# Patient Record
Sex: MALE | URBAN - METROPOLITAN AREA
[De-identification: names, ages, dates, MRNs, and addresses within clinical notes are randomized per-mention and may not be internally consistent; named-entity substitution may affect disease eponyms.]

---

## 2023-01-01 ENCOUNTER — LAB (OUTPATIENT)
Dept: LAB | Facility: CLINIC | Age: 78
End: 2023-01-01
Attending: INTERNAL MEDICINE
Payer: COMMERCIAL

## 2023-01-01 ENCOUNTER — HOSPITAL ENCOUNTER (OUTPATIENT)
Dept: NUCLEAR MEDICINE | Facility: CLINIC | Age: 78
Setting detail: NUCLEAR MEDICINE
Discharge: HOME | End: 2023-10-26
Attending: STUDENT IN AN ORGANIZED HEALTH CARE EDUCATION/TRAINING PROGRAM | Admitting: STUDENT IN AN ORGANIZED HEALTH CARE EDUCATION/TRAINING PROGRAM
Payer: COMMERCIAL

## 2023-01-01 ENCOUNTER — TELEPHONE (OUTPATIENT)
Dept: NUCLEAR MEDICINE | Facility: CLINIC | Age: 78
End: 2023-01-01
Payer: COMMERCIAL

## 2023-01-01 ENCOUNTER — HOSPITAL ENCOUNTER (OUTPATIENT)
Dept: NUCLEAR MEDICINE | Facility: CLINIC | Age: 78
Setting detail: NUCLEAR MEDICINE
Discharge: HOME | End: 2023-12-07
Attending: STUDENT IN AN ORGANIZED HEALTH CARE EDUCATION/TRAINING PROGRAM
Payer: COMMERCIAL

## 2023-01-01 ENCOUNTER — ONCOLOGY VISIT (OUTPATIENT)
Dept: ONCOLOGY | Facility: CLINIC | Age: 78
End: 2023-01-01
Attending: STUDENT IN AN ORGANIZED HEALTH CARE EDUCATION/TRAINING PROGRAM
Payer: COMMERCIAL

## 2023-01-01 ENCOUNTER — APPOINTMENT (OUTPATIENT)
Dept: LAB | Facility: CLINIC | Age: 78
End: 2023-01-01
Attending: STUDENT IN AN ORGANIZED HEALTH CARE EDUCATION/TRAINING PROGRAM
Payer: COMMERCIAL

## 2023-01-01 ENCOUNTER — TELEPHONE (OUTPATIENT)
Dept: NUCLEAR MEDICINE | Facility: CLINIC | Age: 78
End: 2023-01-01

## 2023-01-01 ENCOUNTER — PRE VISIT (OUTPATIENT)
Dept: ONCOLOGY | Facility: CLINIC | Age: 78
End: 2023-01-01
Payer: COMMERCIAL

## 2023-01-01 ENCOUNTER — ALLIED HEALTH/NURSE VISIT (OUTPATIENT)
Dept: ONCOLOGY | Facility: CLINIC | Age: 78
End: 2023-01-01

## 2023-01-01 ENCOUNTER — TRANSCRIBE ORDERS (OUTPATIENT)
Dept: OTHER | Age: 78
End: 2023-01-01

## 2023-01-01 ENCOUNTER — LAB REQUISITION (OUTPATIENT)
Dept: LAB | Facility: CLINIC | Age: 78
End: 2023-01-01
Payer: COMMERCIAL

## 2023-01-01 ENCOUNTER — PATIENT OUTREACH (OUTPATIENT)
Dept: ONCOLOGY | Facility: CLINIC | Age: 78
End: 2023-01-01
Payer: COMMERCIAL

## 2023-01-01 ENCOUNTER — ONCOLOGY VISIT (OUTPATIENT)
Dept: ONCOLOGY | Facility: CLINIC | Age: 78
End: 2023-01-01
Attending: INTERNAL MEDICINE
Payer: COMMERCIAL

## 2023-01-01 VITALS
OXYGEN SATURATION: 98 % | DIASTOLIC BLOOD PRESSURE: 67 MMHG | RESPIRATION RATE: 14 BRPM | SYSTOLIC BLOOD PRESSURE: 128 MMHG | TEMPERATURE: 98 F | HEART RATE: 75 BPM

## 2023-01-01 VITALS
OXYGEN SATURATION: 98 % | BODY MASS INDEX: 24.94 KG/M2 | RESPIRATION RATE: 12 BRPM | TEMPERATURE: 98.4 F | DIASTOLIC BLOOD PRESSURE: 77 MMHG | WEIGHT: 158.9 LBS | HEIGHT: 67 IN | SYSTOLIC BLOOD PRESSURE: 137 MMHG | HEART RATE: 79 BPM

## 2023-01-01 VITALS
TEMPERATURE: 97.8 F | WEIGHT: 153.6 LBS | HEART RATE: 80 BPM | DIASTOLIC BLOOD PRESSURE: 62 MMHG | OXYGEN SATURATION: 97 % | RESPIRATION RATE: 16 BRPM | BODY MASS INDEX: 23.77 KG/M2 | SYSTOLIC BLOOD PRESSURE: 123 MMHG

## 2023-01-01 DIAGNOSIS — D50.8 IRON DEFICIENCY ANEMIA SECONDARY TO INADEQUATE DIETARY IRON INTAKE: Primary | ICD-10-CM

## 2023-01-01 DIAGNOSIS — T45.1X5A ANTINEOPLASTIC CHEMOTHERAPY INDUCED ANEMIA: ICD-10-CM

## 2023-01-01 DIAGNOSIS — C61 HORMONE RESISTANT PROSTATE CANCER (H): Primary | ICD-10-CM

## 2023-01-01 DIAGNOSIS — D64.81 ANTINEOPLASTIC CHEMOTHERAPY INDUCED ANEMIA: ICD-10-CM

## 2023-01-01 DIAGNOSIS — C61 PROSTATE CANCER (H): Primary | ICD-10-CM

## 2023-01-01 DIAGNOSIS — Z19.2 HORMONE RESISTANT PROSTATE CANCER (H): Primary | ICD-10-CM

## 2023-01-01 DIAGNOSIS — C61 PROSTATE CANCER METASTATIC TO BONE (H): ICD-10-CM

## 2023-01-01 DIAGNOSIS — C61 HORMONE RESISTANT PROSTATE CANCER (H): ICD-10-CM

## 2023-01-01 DIAGNOSIS — Z19.2 HORMONE RESISTANT PROSTATE CANCER (H): ICD-10-CM

## 2023-01-01 DIAGNOSIS — C61 PROSTATE CANCER METASTATIC TO BONE (H): Primary | ICD-10-CM

## 2023-01-01 DIAGNOSIS — C79.51 PROSTATE CANCER METASTATIC TO BONE (H): Primary | ICD-10-CM

## 2023-01-01 DIAGNOSIS — C61 MALIGNANT NEOPLASM OF PROSTATE METASTATIC TO LYMPH NODES OF MULTIPLE SITES (H): ICD-10-CM

## 2023-01-01 DIAGNOSIS — C79.51 PROSTATE CANCER METASTATIC TO BONE (H): ICD-10-CM

## 2023-01-01 DIAGNOSIS — C77.8 MALIGNANT NEOPLASM OF PROSTATE METASTATIC TO LYMPH NODES OF MULTIPLE SITES (H): ICD-10-CM

## 2023-01-01 LAB
ABO/RH(D): NORMAL
ALBUMIN SERPL BCG-MCNC: 3.5 G/DL (ref 3.5–5.2)
ALBUMIN SERPL BCG-MCNC: 3.8 G/DL (ref 3.5–5.2)
ALP SERPL-CCNC: 148 U/L (ref 40–129)
ALP SERPL-CCNC: 224 U/L (ref 40–150)
ALT SERPL W P-5'-P-CCNC: 17 U/L (ref 0–70)
ALT SERPL W P-5'-P-CCNC: 22 U/L (ref 0–70)
ANION GAP SERPL CALCULATED.3IONS-SCNC: 11 MMOL/L (ref 7–15)
ANION GAP SERPL CALCULATED.3IONS-SCNC: 11 MMOL/L (ref 7–15)
ANTIBODY SCREEN: NEGATIVE
AST SERPL W P-5'-P-CCNC: 54 U/L (ref 0–45)
AST SERPL W P-5'-P-CCNC: 61 U/L (ref 0–45)
BASO+EOS+MONOS # BLD AUTO: ABNORMAL 10*3/UL
BASO+EOS+MONOS NFR BLD AUTO: ABNORMAL %
BASOPHILS # BLD AUTO: 0.1 10E3/UL (ref 0–0.2)
BASOPHILS # BLD AUTO: 0.1 10E3/UL (ref 0–0.2)
BASOPHILS NFR BLD AUTO: 1 %
BASOPHILS NFR BLD AUTO: 1 %
BILIRUB SERPL-MCNC: 0.2 MG/DL
BILIRUB SERPL-MCNC: 0.4 MG/DL
BUN SERPL-MCNC: 37.1 MG/DL (ref 8–23)
BUN SERPL-MCNC: 42.2 MG/DL (ref 8–23)
CALCIUM SERPL-MCNC: 9.3 MG/DL (ref 8.8–10.2)
CALCIUM SERPL-MCNC: 9.5 MG/DL (ref 8.8–10.2)
CHLORIDE SERPL-SCNC: 104 MMOL/L (ref 98–107)
CHLORIDE SERPL-SCNC: 105 MMOL/L (ref 98–107)
CREAT SERPL-MCNC: 1.21 MG/DL (ref 0.67–1.17)
CREAT SERPL-MCNC: 1.32 MG/DL (ref 0.67–1.17)
DEPRECATED HCO3 PLAS-SCNC: 24 MMOL/L (ref 22–29)
DEPRECATED HCO3 PLAS-SCNC: 25 MMOL/L (ref 22–29)
EGFRCR SERPLBLD CKD-EPI 2021: 56 ML/MIN/1.73M2
EGFRCR SERPLBLD CKD-EPI 2021: 62 ML/MIN/1.73M2
EOSINOPHIL # BLD AUTO: 0 10E3/UL (ref 0–0.7)
EOSINOPHIL # BLD AUTO: 0.5 10E3/UL (ref 0–0.7)
EOSINOPHIL NFR BLD AUTO: 0 %
EOSINOPHIL NFR BLD AUTO: 5 %
ERYTHROCYTE [DISTWIDTH] IN BLOOD BY AUTOMATED COUNT: 17.2 % (ref 10–15)
ERYTHROCYTE [DISTWIDTH] IN BLOOD BY AUTOMATED COUNT: 19.7 % (ref 10–15)
FERRITIN SERPL-MCNC: 472 NG/ML (ref 31–409)
GLUCOSE SERPL-MCNC: 101 MG/DL (ref 70–99)
GLUCOSE SERPL-MCNC: 102 MG/DL (ref 70–99)
HCT VFR BLD AUTO: 29.6 % (ref 40–53)
HCT VFR BLD AUTO: 30.4 % (ref 40–53)
HGB BLD-MCNC: 9.5 G/DL (ref 13.3–17.7)
HGB BLD-MCNC: 9.6 G/DL (ref 13.3–17.7)
IMM GRANULOCYTES # BLD: 0 10E3/UL
IMM GRANULOCYTES # BLD: 0.1 10E3/UL
IMM GRANULOCYTES NFR BLD: 0 %
IMM GRANULOCYTES NFR BLD: 1 %
IRON BINDING CAPACITY (ROCHE): 270 UG/DL (ref 240–430)
IRON SATN MFR SERPL: 14 % (ref 15–46)
IRON SERPL-MCNC: 39 UG/DL (ref 61–157)
LYMPHOCYTES # BLD AUTO: 0.6 10E3/UL (ref 0.8–5.3)
LYMPHOCYTES # BLD AUTO: 0.7 10E3/UL (ref 0.8–5.3)
LYMPHOCYTES NFR BLD AUTO: 5 %
LYMPHOCYTES NFR BLD AUTO: 8 %
MCH RBC QN AUTO: 29.5 PG (ref 26.5–33)
MCH RBC QN AUTO: 30.4 PG (ref 26.5–33)
MCHC RBC AUTO-ENTMCNC: 31.6 G/DL (ref 31.5–36.5)
MCHC RBC AUTO-ENTMCNC: 32.1 G/DL (ref 31.5–36.5)
MCV RBC AUTO: 94 FL (ref 78–100)
MCV RBC AUTO: 95 FL (ref 78–100)
MONOCYTES # BLD AUTO: 0.5 10E3/UL (ref 0–1.3)
MONOCYTES # BLD AUTO: 0.7 10E3/UL (ref 0–1.3)
MONOCYTES NFR BLD AUTO: 3 %
MONOCYTES NFR BLD AUTO: 8 %
NEUTROPHILS # BLD AUTO: 12.1 10E3/UL (ref 1.6–8.3)
NEUTROPHILS # BLD AUTO: 7 10E3/UL (ref 1.6–8.3)
NEUTROPHILS NFR BLD AUTO: 78 %
NEUTROPHILS NFR BLD AUTO: 90 %
NRBC # BLD AUTO: 0 10E3/UL
NRBC # BLD AUTO: 0 10E3/UL
NRBC BLD AUTO-RTO: 0 /100
NRBC BLD AUTO-RTO: 0 /100
PATH REPORT.COMMENTS IMP SPEC: ABNORMAL
PATH REPORT.COMMENTS IMP SPEC: ABNORMAL
PATH REPORT.COMMENTS IMP SPEC: YES
PATH REPORT.FINAL DX SPEC: ABNORMAL
PATH REPORT.MICROSCOPIC SPEC OTHER STN: ABNORMAL
PATH REPORT.RELEVANT HX SPEC: ABNORMAL
PATH REPORT.SITE OF ORIGIN SPEC: ABNORMAL
PLATELET # BLD AUTO: 301 10E3/UL (ref 150–450)
PLATELET # BLD AUTO: 398 10E3/UL (ref 150–450)
POTASSIUM SERPL-SCNC: 4 MMOL/L (ref 3.4–5.3)
POTASSIUM SERPL-SCNC: 4.7 MMOL/L (ref 3.4–5.3)
PROT SERPL-MCNC: 6.3 G/DL (ref 6.4–8.3)
PROT SERPL-MCNC: 6.7 G/DL (ref 6.4–8.3)
PSA SERPL DL<=0.01 NG/ML-MCNC: 243.3 NG/ML (ref 0–6.5)
PSA SERPL DL<=0.01 NG/ML-MCNC: 251.8 NG/ML (ref 0–6.5)
RBC # BLD AUTO: 3.12 10E6/UL (ref 4.4–5.9)
RBC # BLD AUTO: 3.25 10E6/UL (ref 4.4–5.9)
RETICS # AUTO: 0.09 10E6/UL (ref 0.03–0.1)
RETICS/RBC NFR AUTO: 2.7 % (ref 0.5–2)
SODIUM SERPL-SCNC: 139 MMOL/L (ref 135–145)
SODIUM SERPL-SCNC: 141 MMOL/L (ref 135–145)
SPECIMEN EXPIRATION DATE: NORMAL
TESTOST SERPL-MCNC: <2 NG/DL (ref 240–950)
TRANSFERRIN SERPL-MCNC: 217 MG/DL (ref 200–360)
WBC # BLD AUTO: 13.3 10E3/UL (ref 4–11)
WBC # BLD AUTO: 9 10E3/UL (ref 4–11)

## 2023-01-01 PROCEDURE — 79101 NUCLEAR RX IV ADMIN: CPT | Mod: 26 | Performed by: RADIOLOGY

## 2023-01-01 PROCEDURE — 36591 DRAW BLOOD OFF VENOUS DEVICE: CPT | Performed by: STUDENT IN AN ORGANIZED HEALTH CARE EDUCATION/TRAINING PROGRAM

## 2023-01-01 PROCEDURE — 84153 ASSAY OF PSA TOTAL: CPT | Performed by: STUDENT IN AN ORGANIZED HEALTH CARE EDUCATION/TRAINING PROGRAM

## 2023-01-01 PROCEDURE — 99215 OFFICE O/P EST HI 40 MIN: CPT | Performed by: STUDENT IN AN ORGANIZED HEALTH CARE EDUCATION/TRAINING PROGRAM

## 2023-01-01 PROCEDURE — G0463 HOSPITAL OUTPT CLINIC VISIT: HCPCS | Performed by: STUDENT IN AN ORGANIZED HEALTH CARE EDUCATION/TRAINING PROGRAM

## 2023-01-01 PROCEDURE — A9607 HC RX 344: HCPCS | Performed by: STUDENT IN AN ORGANIZED HEALTH CARE EDUCATION/TRAINING PROGRAM

## 2023-01-01 PROCEDURE — 85014 HEMATOCRIT: CPT

## 2023-01-01 PROCEDURE — 79101 NUCLEAR RX IV ADMIN: CPT

## 2023-01-01 PROCEDURE — A9607 HC RX 344: HCPCS | Mod: JZ | Performed by: STUDENT IN AN ORGANIZED HEALTH CARE EDUCATION/TRAINING PROGRAM

## 2023-01-01 PROCEDURE — 83550 IRON BINDING TEST: CPT | Performed by: STUDENT IN AN ORGANIZED HEALTH CARE EDUCATION/TRAINING PROGRAM

## 2023-01-01 PROCEDURE — 80053 COMPREHEN METABOLIC PANEL: CPT | Performed by: STUDENT IN AN ORGANIZED HEALTH CARE EDUCATION/TRAINING PROGRAM

## 2023-01-01 PROCEDURE — 344N000001 HC RX 344: Mod: JZ | Performed by: STUDENT IN AN ORGANIZED HEALTH CARE EDUCATION/TRAINING PROGRAM

## 2023-01-01 PROCEDURE — 88321 CONSLTJ&REPRT SLD PREP ELSWR: CPT | Performed by: PATHOLOGY

## 2023-01-01 PROCEDURE — 82728 ASSAY OF FERRITIN: CPT | Performed by: STUDENT IN AN ORGANIZED HEALTH CARE EDUCATION/TRAINING PROGRAM

## 2023-01-01 PROCEDURE — 85025 COMPLETE CBC W/AUTO DIFF WBC: CPT | Performed by: STUDENT IN AN ORGANIZED HEALTH CARE EDUCATION/TRAINING PROGRAM

## 2023-01-01 PROCEDURE — 99205 OFFICE O/P NEW HI 60 MIN: CPT | Performed by: STUDENT IN AN ORGANIZED HEALTH CARE EDUCATION/TRAINING PROGRAM

## 2023-01-01 PROCEDURE — 84153 ASSAY OF PSA TOTAL: CPT

## 2023-01-01 PROCEDURE — 80053 COMPREHEN METABOLIC PANEL: CPT

## 2023-01-01 PROCEDURE — 84403 ASSAY OF TOTAL TESTOSTERONE: CPT | Performed by: STUDENT IN AN ORGANIZED HEALTH CARE EDUCATION/TRAINING PROGRAM

## 2023-01-01 PROCEDURE — 250N000011 HC RX IP 250 OP 636: Mod: JZ | Performed by: STUDENT IN AN ORGANIZED HEALTH CARE EDUCATION/TRAINING PROGRAM

## 2023-01-01 PROCEDURE — 344N000001 HC RX 344: Performed by: STUDENT IN AN ORGANIZED HEALTH CARE EDUCATION/TRAINING PROGRAM

## 2023-01-01 PROCEDURE — 86901 BLOOD TYPING SEROLOGIC RH(D): CPT | Performed by: STUDENT IN AN ORGANIZED HEALTH CARE EDUCATION/TRAINING PROGRAM

## 2023-01-01 PROCEDURE — 85045 AUTOMATED RETICULOCYTE COUNT: CPT | Performed by: STUDENT IN AN ORGANIZED HEALTH CARE EDUCATION/TRAINING PROGRAM

## 2023-01-01 PROCEDURE — 84466 ASSAY OF TRANSFERRIN: CPT | Performed by: STUDENT IN AN ORGANIZED HEALTH CARE EDUCATION/TRAINING PROGRAM

## 2023-01-01 RX ORDER — LORAZEPAM 0.5 MG/1
.5-1 TABLET ORAL EVERY 6 HOURS PRN
Status: DISCONTINUED | OUTPATIENT
Start: 2023-01-01 | End: 2023-01-01 | Stop reason: HOSPADM

## 2023-01-01 RX ORDER — ALBUTEROL SULFATE 90 UG/1
1-2 AEROSOL, METERED RESPIRATORY (INHALATION)
Status: CANCELLED
Start: 2023-01-01

## 2023-01-01 RX ORDER — ONDANSETRON 8 MG/1
8 TABLET, FILM COATED ORAL
Status: DISCONTINUED | OUTPATIENT
Start: 2023-01-01 | End: 2023-01-01 | Stop reason: HOSPADM

## 2023-01-01 RX ORDER — ASPIRIN 81 MG/1
81 TABLET ORAL
COMMUNITY
Start: 2023-03-25

## 2023-01-01 RX ORDER — METHYLPREDNISOLONE SODIUM SUCCINATE 125 MG/2ML
125 INJECTION, POWDER, LYOPHILIZED, FOR SOLUTION INTRAMUSCULAR; INTRAVENOUS
Status: CANCELLED
Start: 2023-01-01

## 2023-01-01 RX ORDER — EPINEPHRINE 1 MG/ML
0.3 INJECTION, SOLUTION INTRAMUSCULAR; SUBCUTANEOUS EVERY 5 MIN PRN
Status: CANCELLED | OUTPATIENT
Start: 2023-01-01

## 2023-01-01 RX ORDER — LORAZEPAM 2 MG/ML
.5-1 INJECTION INTRAMUSCULAR EVERY 6 HOURS PRN
Status: CANCELLED | OUTPATIENT
Start: 2023-01-01

## 2023-01-01 RX ORDER — LORAZEPAM 2 MG/ML
.5-1 INJECTION INTRAMUSCULAR EVERY 6 HOURS PRN
Status: DISCONTINUED | OUTPATIENT
Start: 2023-01-01 | End: 2023-01-01 | Stop reason: HOSPADM

## 2023-01-01 RX ORDER — DIPHENHYDRAMINE HYDROCHLORIDE 50 MG/ML
50 INJECTION INTRAMUSCULAR; INTRAVENOUS
Status: DISCONTINUED | OUTPATIENT
Start: 2023-01-01 | End: 2023-01-01 | Stop reason: HOSPADM

## 2023-01-01 RX ORDER — METHYLPREDNISOLONE SODIUM SUCCINATE 125 MG/2ML
125 INJECTION, POWDER, LYOPHILIZED, FOR SOLUTION INTRAMUSCULAR; INTRAVENOUS
Status: DISCONTINUED | OUTPATIENT
Start: 2023-01-01 | End: 2023-01-01 | Stop reason: HOSPADM

## 2023-01-01 RX ORDER — ALBUTEROL SULFATE 0.83 MG/ML
2.5 SOLUTION RESPIRATORY (INHALATION)
Status: DISCONTINUED | OUTPATIENT
Start: 2023-01-01 | End: 2023-01-01 | Stop reason: HOSPADM

## 2023-01-01 RX ORDER — PROCHLORPERAZINE MALEATE 5 MG
5 TABLET ORAL EVERY 6 HOURS PRN
Status: DISCONTINUED | OUTPATIENT
Start: 2023-01-01 | End: 2023-01-01 | Stop reason: HOSPADM

## 2023-01-01 RX ORDER — EPINEPHRINE 0.3 MG/.3ML
0.3 INJECTION SUBCUTANEOUS EVERY 5 MIN PRN
Status: DISCONTINUED | OUTPATIENT
Start: 2023-01-01 | End: 2023-01-01 | Stop reason: HOSPADM

## 2023-01-01 RX ORDER — LOSARTAN POTASSIUM 100 MG/1
1 TABLET ORAL DAILY
COMMUNITY
Start: 2023-01-01

## 2023-01-01 RX ORDER — OXYBUTYNIN CHLORIDE 10 MG/1
10 TABLET, EXTENDED RELEASE ORAL DAILY
COMMUNITY
Start: 2023-01-01 | End: 2024-10-16

## 2023-01-01 RX ORDER — LORAZEPAM 0.5 MG/1
.5-1 TABLET ORAL EVERY 6 HOURS PRN
Status: CANCELLED
Start: 2023-01-01

## 2023-01-01 RX ORDER — ALBUTEROL SULFATE 90 UG/1
1-2 AEROSOL, METERED RESPIRATORY (INHALATION)
Status: DISCONTINUED | OUTPATIENT
Start: 2023-01-01 | End: 2023-01-01 | Stop reason: HOSPADM

## 2023-01-01 RX ORDER — HEPARIN SODIUM (PORCINE) LOCK FLUSH IV SOLN 100 UNIT/ML 100 UNIT/ML
5 SOLUTION INTRAVENOUS ONCE
Status: COMPLETED | OUTPATIENT
Start: 2023-01-01 | End: 2023-01-01

## 2023-01-01 RX ORDER — DIPHENHYDRAMINE HYDROCHLORIDE 50 MG/ML
50 INJECTION INTRAMUSCULAR; INTRAVENOUS
Status: CANCELLED
Start: 2023-01-01

## 2023-01-01 RX ORDER — PROCHLORPERAZINE MALEATE 5 MG
5 TABLET ORAL EVERY 6 HOURS PRN
Status: CANCELLED
Start: 2023-01-01

## 2023-01-01 RX ORDER — MEPERIDINE HYDROCHLORIDE 25 MG/ML
25 INJECTION INTRAMUSCULAR; INTRAVENOUS; SUBCUTANEOUS EVERY 30 MIN PRN
Status: CANCELLED | OUTPATIENT
Start: 2023-01-01

## 2023-01-01 RX ORDER — PREDNISONE 10 MG/1
10 TABLET ORAL DAILY
COMMUNITY
Start: 2023-01-01

## 2023-01-01 RX ORDER — HEPARIN SODIUM (PORCINE) LOCK FLUSH IV SOLN 100 UNIT/ML 100 UNIT/ML
5 SOLUTION INTRAVENOUS
Status: COMPLETED | OUTPATIENT
Start: 2023-01-01 | End: 2023-01-01

## 2023-01-01 RX ORDER — EPINEPHRINE 1 MG/ML
0.3 INJECTION, SOLUTION, CONCENTRATE INTRAVENOUS EVERY 5 MIN PRN
Status: DISCONTINUED | OUTPATIENT
Start: 2023-01-01 | End: 2023-01-01 | Stop reason: HOSPADM

## 2023-01-01 RX ORDER — ATORVASTATIN CALCIUM 80 MG/1
1 TABLET, FILM COATED ORAL EVERY EVENING
COMMUNITY
Start: 2023-01-01

## 2023-01-01 RX ORDER — ONDANSETRON 4 MG/1
8 TABLET, FILM COATED ORAL
Status: CANCELLED | OUTPATIENT
Start: 2023-01-01

## 2023-01-01 RX ORDER — AMLODIPINE BESYLATE 5 MG/1
1 TABLET ORAL DAILY
COMMUNITY
Start: 2023-01-01

## 2023-01-01 RX ORDER — DEXAMETHASONE 4 MG/1
TABLET ORAL
COMMUNITY
Start: 2023-04-13

## 2023-01-01 RX ORDER — ALBUTEROL SULFATE 0.83 MG/ML
2.5 SOLUTION RESPIRATORY (INHALATION)
Status: CANCELLED | OUTPATIENT
Start: 2023-01-01

## 2023-01-01 RX ORDER — MEPERIDINE HYDROCHLORIDE 25 MG/ML
25 INJECTION INTRAMUSCULAR; INTRAVENOUS; SUBCUTANEOUS EVERY 30 MIN PRN
Status: DISCONTINUED | OUTPATIENT
Start: 2023-01-01 | End: 2023-01-01 | Stop reason: HOSPADM

## 2023-01-01 RX ORDER — CHLORTHALIDONE 25 MG/1
0.5 TABLET ORAL DAILY
COMMUNITY
Start: 2023-01-01

## 2023-01-01 RX ADMIN — LUTETIUM LU 177 VIPIVOTIDE TETRAXETAN 200 MILLICURIE: 27 INJECTION, SOLUTION INTRAVENOUS at 10:29

## 2023-01-01 RX ADMIN — Medication 5 ML: at 09:06

## 2023-01-01 RX ADMIN — Medication 5 ML: at 10:08

## 2023-01-01 RX ADMIN — LUTETIUM LU 177 VIPIVOTIDE TETRAXETAN 200 MILLICURIE: 27 INJECTION, SOLUTION INTRAVENOUS at 10:38

## 2023-01-01 ASSESSMENT — PAIN SCALES - GENERAL
PAINLEVEL: NO PAIN (0)
PAINLEVEL: NO PAIN (0)

## 2023-03-02 ENCOUNTER — TRANSFERRED RECORDS (OUTPATIENT)
Dept: HEALTH INFORMATION MANAGEMENT | Facility: CLINIC | Age: 78
End: 2023-03-02

## 2023-10-03 NOTE — PROGRESS NOTES
New Patient Oncology Nurse Navigator Note     Referring provider:   Dr Bry Ann at [facility] in [facility]     Referred to (specialty): Medical Oncology -   Dr Gee Lindsay for consideration of radioactive treatment       Date Referral Received:   10/2/23     Evaluation for :   Prostate cancer, consideration for Pluvicto     Clinical History (per Nurse review of records provided):    See oncology records from [facility] for full history.  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 Lindsay @ [facility], in person  2) Schedule for a lab draw following the consultation    I called in attempt to reach patient to discuss referral to medical oncology.  There was NA so I left message with new patient scheduling to schedule.  Plan per above.    Patient returned my call.   I introduced my role and reviewed what this consult visit will entail/what to expect.  I reviewed the location and gave contact numbers including new patient scheduling and clinic phone numbers.   Domenico, has no other questions at this time.    I forwarded on referral with scheduling instructions for the following PLAN: per above    I transferred call to our new patient scheduling to finalize appointment.    10/11/23  Dr. Lindsay is requesting for patient to be moved to sooner spot due to quickly rising PSA.  I checked in with Moreno, and he will take 10/23 830 appt.  He declined the 10/16 @ 8AM.  I will update instructions and have this finalized by scheduling.    Sandra Yip, RN, BSN  Oncology New Patient Nurse Navigator   [facility]  [phone]

## 2023-10-16 PROBLEM — Z19.2 HORMONE RESISTANT PROSTATE CANCER (H): Status: ACTIVE | Noted: 2023-01-01

## 2023-10-16 PROBLEM — C61 HORMONE RESISTANT PROSTATE CANCER (H): Status: ACTIVE | Noted: 2023-01-01

## 2023-10-17 NOTE — TELEPHONE ENCOUNTER
Action 2023 3:17 PM ABT   Action Taken Records from PN received and sent to HIM for upload ([facility] report)       RECORDS STATUS - ALL OTHER DIAGNOSIS      RECORDS RECEIVED FROM:    DATE RECEIVED:    NOTES STATUS DETAILS   OFFICE NOTE from referring provider [facility] 23: Dr. Bry Ann   OFFICE NOTE from medical oncologist - 23: Dr. Bry Ann   OFFICE NOTE from other specialist [facility] Rad Onc:  22: Dr. Neo Richardson    Uro:  23: Connor W. Sacks  21: Robert Saha  21: Scott Alvarado   DISCHARGE SUMMARY from hospital - 23, 21: [facility]   DISCHARGE REPORT from the ER [facility] 22: [facility]   OPERATIVE REPORT [facility] 23: Ureteral stent exchange and catheter exchange    23: Ureteral stent exchange, Retrograde pyelogram    22: Ureteral stent change   MEDICATION LIST [facility]    LABS     PATHOLOGY REPORTS Req 10/17-[facility]  [employer] 23: [id number]  21: [id number]   ANYTHING RELATED TO DIAGNOSIS - Most recent 23   GENONOMIC TESTING     TYPE: External: [facility] 23   IMAGING (NEED IMAGES & REPORT)     CT SCANS PACS HP:  23: CT Chest Abd Pel  23-21: CT Abd Pel   MRI PACS HP:  22, 12/15/18: MR Brain   NM PACS HP:  23-21: NM Bone Scan   ULTRASOUND PACS HP:  19: US Abd   PET PACS HP:  23: PET CT PSMA

## 2023-10-19 NOTE — TELEPHONE ENCOUNTER
Action October 19, 2023 2:06 PM SY   Action Taken Slides from [facility] received and taken to 5th [facility] path lab for review.  08/24/23: [id number]  08/16/21: [id number]  (15 slides)

## 2023-10-22 NOTE — PROGRESS NOTES
[facility] Medical Oncology Second Opinion Consultation Note       Date of visit: October 23, 2023    Dear Dr. Bry Ann, thank you for referring your patient for a Second Opinion consultation at the [facility].  A brief overview of his oncological history as well as my recommendations follow below.    CC: Second opinion mCRPC, consideration for Pluvicto vs clinical trial     HPI: Feeling OK overall.  Tired, but able to do everything he needs to do.  Some dry mouth at baseline.  Appetite isn't great, but snacks throughout the day.  No pain.  No edema.  Catheter is working without issues.  He is able to isolate as directed after Pluvicto dose.  Blood consent signed.  He knows he can get transfusion at [facility] or at [facility].  His dose of Pluvicto is pending scheduling with nuclear medicine based on availability, but may be as soon as this Thursday.      ONCOLOGY HISTORY:    #Prostate cancer.   July/August 2021-Hematuria noted.  Imaging workup notable for bilateral hydronephrosis and abdominal adenopathy up to 4.1cm in largest diameter.  .    8/16/21-Perineural invasion is present.  Zay 4+4=8 in 2 cores.  9/9 cores positive for prostate adenocarcinoma.  Perineural invasion was present.  Start bicalutamide. Bilateral PNT placement.   9/15/21-Lupron dose #1.   12/8/21-Start abiraterone/prednisone.   3/7/22-PSA austin to 10.3.    4/2022-Start radiation on clinical trial .  5580 cGy in 36 fractions delivered.  Completed 6/6/22.  Reduced from planned 7920cGy due to symptoms.    8/1/22-PSA 7.4.   10/2022-PSA rising to 26.  Noted to be CRPC.  Progressive bony disease noted on restaging studies.   11/30/22-Start enzalutamide.    2/27/23-PSA continued to rise to 107 on enzalutamide.  Start docetaxel in CRPC setting.    6/15/23-PSA austin on docetaxel of 115.7.    7/6/23-.6  8/17/23-  9/7/23-.9  9/29/23-.5.  Referral to [facility] for  "consideration of Pluvicto.   10/23/23-Initial consultation for Pluvicto.  Scheduled dose for 10/26 possibly.  Labs adequate for Dose #1 with Hgb of 9.5.  No transfusion needed.      GENOMIC STUDIES:     Germline genomic testing: Pathogenic POLE variant, deemed to be unrelated to cancer risk.      STRATA Somatic sequencing of bone metastasis 8/24/23: AR amplification, CDKN2B deep deletion, [id number] Fusion. Negative Genomic Findings included JIM, BRAF, BRCA1, BRCA2, BRIP1, CDK12, CHEK1, CHEK2, NTRK1, NTRK2, NTRK3, PALB2, RET.  Genomic Signatures Microsatellite Stable, TMB Low (1 muts/Mb) Immunotherapy Response Score IRS Ultra-Low.     TREATMENT HISTORY:   Lupron  Abiraterone/prednisone  Enzalutamide  Docetaxel-CRPC  Pending Pluvicto dose #1.     GUIDELINES USED: NCCN    INTENT OF THERAPY: Palliative.  Discussed at 10/23/23 appt.      CLINICAL TRIALS:  [employer] CD3-PSMA and  Biobank    PMH:  mCRPC  HTN  HLD  Urinary retention    PSH:  Prostate biopsy    FAMILY HX:    SOCIAL:  Former smoker, 2 PPD x55 years.  Quit 1/6/19  Never smokeless tobacco user.   EtOH-6 drinks/week  Recreational drugs-None  Herbs/supplements-None    ALLERGIES: amlodipine (edema), bee venom (high), penicillins (tolerated ceftriaxone 2020, ampicillin and Unasyn in March 2023 per [facility] pharmacy notes).      MEDS:  Amlodipine 5mg  Asa 81mg daily   Atorvastatin 80mg at bedtime  Calcium carbonate  Chlorthalidone  Losartan 100mg daily     ROS-Remainder of 14 point ROS reviewed and negative except as in HPI.    PHYSICAL EXAM:  ECOG-PS=1    /77 (BP Location: Right arm, Patient Position: Sitting, Cuff Size: Adult Regular)   Pulse 79   Temp 98.4  F (36.9  C) (Oral)   Resp 12   Ht 1.712 m (5' 7.4\")   Wt 72.1 kg (158 lb 14.4 oz)   SpO2 98%   BMI 24.59 kg/m    Exam:  Constitutional: alert and no distress, pale appearing.   Head: Normocephalic. No masses, lesions, tenderness or abnormalities  Neck: Neck supple. No adenopathy grossly, " trachea midline.   ENT: ENT exam normal, MMM, no sores/ulcers visible.   Cardiovascular: No edema or JVD.  Respiratory: negative, normal WOB on RA, no wheezing or rhonchi audible.   Gastrointestinal: negative, non-distended.   : Deferred  Musculoskeletal: extremities normal- no gross deformities noted and normal muscle tone, walking with cane.   Skin: no suspicious lesions or rashes on exposed areas of skin   Neurologic: negative, CNII-XII grossly intact, normal speech.   Psychiatric: mentation appears normal and affect normal/bright    LABS AND IMAGES:    PSA trend, see oncology history.     10/23/23 labs Creatinine 1.2, approximately baseline.  AST mildly elevated to 61.      Iron panel consistent with anemia of chronic disease.      CBC with WBC of 13.3, ANC 12.1, Hgb 9.5, plt 301.     9/29/23 Labs from [facility]  CBC with WBC of 9.0, ANC 8.2, Hgb 8.8, Plt 368.    GFR 57mL/minute (approximate baseline).      8/31/23 PSMA PET from [facility].   Extensive PSMA uptake in bone and soft tissue metastases.      IMPRESSION AND PLAN:    # metastatic, castration resistant prostate cancer.      Dandre Garcia presented initially on 10/23/23 for second opinion and consideration of Pluvicto for metastatic, castration-resistant prostate cancer.  He was referred by his primary oncologist, Dr. Bry nAn from [facility].  He was initially diagnosed with Dodge 4+4=8 prostate adenocarcinoma in August of 2021 with extensive adenopathy and a PSA of 649.  His PSA austin on therapy was 10.3.  He then  underwent radiation therapy to the prostate and pelvis as part of the  trial, but received a reduced dose of 5580cGy due to urinary symptoms.  Due to PSA rise, he was switched to enzalutamide in 11/2022 and then docetaxel in March of 2023. He had a temporarily PSA response to docetaxel, but then PSA began to rise above his pre-treatment baseline.  He has undergone germline and somatic genomic testing and there  were no targetable mutations noted.  He was referred for consideration of Pluvicto therapy for mCPRC.      At our initial appointment, we discussed the concept of Pluvicto and its mechanism.  We discussed side effects and efficacy as well, noting that the VISION trial had an approximately 50% response rate, but the real-world experience has been significantly lower (~20%) in my experience.  He had previously been informed of the isolation requirements and was able to comply.  We again discussed these restrictions and the rationale for them.  In his particular case, we were able to expedite his treatment due to another patient progressing on therapy, so we had already received financial clearance and scheduled his dose for 10/26/23.  We discussed the possibility of cytopenias in particular.  Since his last dose of docetaxel was approximately 3.5 weeks prior to his Pluvicto dose, he likely will continue to have chemotherapy-induced cytopenias (anemia).  We obtained consent for blood products and will transfuse as needed for therapy.  We also discussed follow-up, which should be at least 2+ weeks prior to his next dose so that we can cancel doses if there is progression or intolerance.  Typically we are able to determine response trend after the second or third dose.  Should he progress, he would be eligible for both KHARI-280 and [id number] immune engager trials if his lab values and performance status remain stable overall.      Blood consent was signed at 10/23/23 appointment, added to chart as a photograph, and submitted for scanning on the day of consent.  Our goal would be a Hgb of 9 for treatment, which he met at 9.5 on labs from 10/23 at [facility].  He is good to proceed with Dose #1.      PLAN:   Labs today after our appointment.   We will call you once we determine whether you need a blood transfusion or not.    I will message nuclear medicine right away once we get labs to get your dose scheduled.  I do not know  the exact date yet.  It may be as early as this Thursday.   Once your dose is scheduled, I will want to see you back 2-3 weeks prior to your second dose with labs.    Please keep your regularly scheduled appointments with Dr. Ann.    A total of 80 minutes were spent on this patient on the day of the encounter, of which more than 50% of this time was used for counseling and coordination of care.  The patient and his wife given the opportunity to ask multiple questions today, all of which were answered to their satisfaction.    Gee Lindsay MD, PhD   of Medicine   Oncology/BMT/Cellular Therapies

## 2023-10-23 NOTE — NURSING NOTE
"Chief Complaint   Patient presents with    Lab Only     Labs drawn from port by rn.      Port accessed with 20 gauge 3/4\" gripper needle and labs drawn by rn.  Port flushed with NS and heparin then de-accessed.  Pt tolerated well.      Cherry Castaneda RN    "

## 2023-10-23 NOTE — PATIENT INSTRUCTIONS
Domenico,   It was nice to meet you today.   Labs today after our appointment.   We will call you once we determine whether you need a blood transfusion or not.    I will message nuclear medicine right away once we get labs to get your dose scheduled.  I do not know the exact date yet.  It may be as early as this Thursday.   Once your dose is scheduled, I will want to see you back 2-3 weeks prior to your second dose with labs.    Please keep your regularly scheduled appointments with Dr. Ann.

## 2023-10-23 NOTE — NURSING NOTE
"Oncology Rooming Note    October 23, 2023 8:35 AM   Domenico Garcia is a 78 year old male who presents for:    Chief Complaint   Patient presents with    Oncology Clinic Visit     Hormone resistant prostate cancer      Initial Vitals: /77 (BP Location: Right arm, Patient Position: Sitting, Cuff Size: Adult Regular)   Pulse 79   Temp 98.4  F (36.9  C) (Oral)   Resp 12   Ht 1.712 m (5' 7.4\")   Wt 72.1 kg (158 lb 14.4 oz)   SpO2 98%   BMI 24.59 kg/m   Estimated body mass index is 24.59 kg/m  as calculated from the following:    Height as of this encounter: 1.712 m (5' 7.4\").    Weight as of this encounter: 72.1 kg (158 lb 14.4 oz). Body surface area is 1.85 meters squared.  No Pain (0) Comment: Data Unavailable   No LMP for male patient.  Allergies reviewed: Yes  Medications reviewed: Yes    Medications: Medication refills not needed today.  Pharmacy name entered into [email or web address]: [facility] [id number] [place] [id number]    Clinical concerns:  none      Danay Handy              "

## 2023-10-26 NOTE — PROGRESS NOTES
[facility] Nursing Note:    Patient presents today for Pluvicto therapy, dose 1 of  6  Patient seen by provider today: Yes: Dr. Nicholas   present during visit today: NOT APPLICABLE      Intravenous Access:  Peripheral IV placed     Treatment Conditions:  Labs done on  10/23/23    Results reviewed, labs Met treatment parameters, ok to proceed with treatment.           Post Infusion Assessment:  Patient tolerated infusion without incident.    PIV - No evidence of extravasations, access discontinued per protocol.         Discharge Plan:   Patient will return as scheduled for next appointment.   Patient discharged at 10:59am  in stable condition accompanied by: self  Departure Mode: Ambulatory

## 2023-11-19 NOTE — PROGRESS NOTES
[facility] Medical Oncology Return Visit Note       Date of visit: November 20, 2023    CC: Return visit prior to C2 Pluvicto.     INTERVAL HISTORY: Tolerated dose #1 well.  No nausea, no nausea medications needed.  Fatigue is stable and unchanged.  A little more dry mouth, but better over the past few weeks.  Dry mouth is not impairing his ability to eat.  Mouth is just dry in the morning and drinks some water.  Fell twice in the past few weeks.  No LOC.  No head strike.  Felt that legs just gave out.      ONCOLOGY HISTORY:    #Prostate cancer.   July/August 2021-Hematuria noted.  Imaging workup notable for bilateral hydronephrosis and abdominal adenopathy up to 4.1cm in largest diameter.  .    8/16/21-Perineural invasion is present.  Zay 4+4=8 in 2 cores.  9/9 cores positive for prostate adenocarcinoma.  Perineural invasion was present.  Start bicalutamide. Bilateral PNT placement.   9/15/21-Lupron dose #1.   12/8/21-Start abiraterone/prednisone.   3/7/22-PSA austin to 10.3.    4/2022-Start radiation on clinical trial .  5580 cGy in 36 fractions delivered.  Completed 6/6/22.  Reduced from planned 7920cGy due to symptoms.    8/1/22-PSA 7.4.   10/2022-PSA rising to 26.  Noted to be CRPC.  Progressive bony disease noted on restaging studies.   11/30/22-Start enzalutamide.    2/27/23-PSA continued to rise to 107 on enzalutamide.  Start docetaxel in CRPC setting.    6/15/23-PSA austin on docetaxel of 115.7.    7/6/23-.6  8/17/23-  9/7/23-.9  9/29/23-.5.  Referral to N for consideration of Pluvicto.   10/23/23-Initial consultation for Pluvicto.   Labs adequate for Dose #1 with Hgb of 9.5.  No transfusion needed.    10/26/23-Pluvicto Dose #1.   11/91/23-Pre-visit for Dose #2.  Hgb 9.6.  PSA is pending.  Labs adequate to proceed with Dose #2 of Pluvicto.     GENOMIC STUDIES:     Germline genomic testing: Pathogenic POLE variant, deemed to be unrelated to cancer risk.       STRATA Somatic sequencing of bone metastasis 8/24/23: AR amplification, CDKN2B deep deletion, [id number] Fusion. Negative Genomic Findings included JIM, BRAF, BRCA1, BRCA2, BRIP1, CDK12, CHEK1, CHEK2, NTRK1, NTRK2, NTRK3, PALB2, RET.  Genomic Signatures Microsatellite Stable, TMB Low (1 muts/Mb) Immunotherapy Response Score IRS Ultra-Low.     TREATMENT HISTORY:   Lupron  Abiraterone/prednisone  Enzalutamide  Docetaxel-CRPC  Pluvicto 10/26/23-ongoing     GUIDELINES USED: NCCN    INTENT OF THERAPY: Palliative.  Discussed at 10/23/23 appt.      CLINICAL TRIALS:  [employer] CD3-PSMA and  Biobank    ALLERGIES: amlodipine (edema), bee venom (high), penicillins (tolerated ceftriaxone 2020, ampicillin and Unasyn in March 2023 per [facility] pharmacy notes).      MEDS:  Amlodipine 5mg  Asa 81mg daily   Atorvastatin 80mg at bedtime  Calcium carbonate  Chlorthalidone  Losartan 100mg daily   Pluvicto     ROS-Remainder of 14 point ROS reviewed and negative except as in HPI.    PHYSICAL EXAM:  ECOG-PS=1    /62 (BP Location: Right arm, Patient Position: Sitting, Cuff Size: Adult Regular)   Pulse 80   Temp 97.8  F (36.6  C) (Oral)   Resp 16   Wt 69.7 kg (153 lb 9.6 oz)   SpO2 97%   BMI 23.77 kg/m    Exam:  Constitutional: alert and no distress, pale appearing.   Head: Normocephalic. No masses, lesions, tenderness or abnormalities  Neck: Neck supple. No adenopathy grossly, trachea midline.   ENT: ENT exam normal, MMM, no sores/ulcers visible.   Cardiovascular: No edema or JVD.  Respiratory: negative, normal WOB on RA, no wheezing or rhonchi audible.   Gastrointestinal: negative, non-distended.   : Deferred  Musculoskeletal: extremities normal- no gross deformities noted and normal muscle tone, walking with cane.   Skin: no suspicious lesions or rashes on exposed areas of skin   Neurologic: negative, CNII-XII grossly intact, normal speech.   Psychiatric: mentation appears normal and affect normal/bright    LABS AND  IMAGES:    PSA trend, see oncology history.     11/20/23 labs.  CMP WNL except mild decline in creatinine to 1.32.  AKP slight rise to 224.  AST improved to 54.  WBC 9.0, Hgb 9.6, plt 398.      Iron panel with mildly low iron sat, mildly elevated ferritin.      IMPRESSION AND PLAN:    # metastatic, castration resistant prostate cancer.      Dandre Garcia presented initially on 10/23/23 for second opinion and consideration of Pluvicto for metastatic, castration-resistant prostate cancer.  He was referred by his primary oncologist, Dr. Bry Ann from [facility].  He was initially diagnosed with Zay 4+4=8 prostate adenocarcinoma in August of 2021 with extensive adenopathy and a PSA of 649.  His PSA austin on therapy was 10.3.  He then  underwent radiation therapy to the prostate and pelvis as part of the  trial, but received a reduced dose of 5580cGy due to urinary symptoms.  Due to PSA rise, he was switched to enzalutamide in 11/2022 and then docetaxel in March of 2023. He had a temporarily PSA response to docetaxel, but then PSA began to rise above his pre-treatment baseline.  He has undergone germline and somatic genomic testing and there were no targetable mutations noted.  He was referred for consideration of Pluvicto therapy for mCPRC.      At our initial appointment, we discussed the concept of Pluvicto and its mechanism.  We discussed side effects and efficacy as well, noting that the VISION trial had an approximately 50% response rate, but the real-world experience has been significantly lower (~20%) in my experience.  He had previously been informed of the isolation requirements and was able to comply.  We again discussed these restrictions and the rationale for them.  In his particular case, we were able to expedite his treatment due to another patient progressing on therapy, so we had already received financial clearance and scheduled his dose for 10/26/23.  We discussed the  possibility of cytopenias in particular.  Since his last dose of docetaxel was approximately 3.5 weeks prior to his Pluvicto dose, he likely will continue to have chemotherapy-induced cytopenias (anemia).  We obtained consent for blood products and will transfuse as needed for therapy.  We also discussed follow-up, which should be at least 2+ weeks prior to his next dose so that we can cancel doses if there is progression or intolerance.  Typically we are able to determine response trend after the second or third dose.  Should he progress, he would be eligible for both KHARI-280 and [id number] immune engager trials if his lab values and performance status remain stable overall.      Blood consent was signed at 10/23/23 appointment, added to chart as a photograph, and submitted for scanning on the day of consent.  Our goal would be a Hgb of 9 for treatment.  Labs from 11/20/23 showed stable Hgb of 9.6.  Remaining labs are stable and adequate for Dose #2 of Pluvicto.  Dry mouth is minimal and fatigue/nausea were not issues after cycle 1.      # possible iron deficiency anemia vs anemia of chronic disease.    Hgb is low and iron saturation is mildly low.  Ferritin is slightly elevated. This is bordeline HILARY vs anemia of chronic disease, but I think it is worth a short course of iron supplementation to see if there is improvement.  If no improvement after 2-3 months, then reasonable to stop to prevent iron overload.  Recommend iron tab every other day.     # falls.  Two prior to 11/20/23 visit.  No LOC no head strike.  Consider PT referral for strength and balance.      PLAN:   Good to go for your next dose of Pluvicto as scheduled.  I will message you with the PSA result.    The PSA can sometimes go up after the first dose, but then decline at subsequent doses.  We usually know how you are responding after the 2nd or 3rd dose.   See Apurva or Karen ~2 weeks prior to your next two doses then see me.    Start an iron  tablet once every other day.  Over the counter from [employer] or [facility] ferrous sulfate or ferrous gluconate are good.  We will watch and see if this helps your hemoglobin at all.    Your stools may be black with the iron tablets.   Keep your follow-up as scheduled with Dr. Ann for Lupron and your other prostate cancer medications.   Consider a physical therapy referral at [facility] for your falls.   Try to add an additional glass of water every day for hydration.     A total of 40 minutes were spent on this patient on the day of the encounter, of which more than 50% of this time was used for counseling and coordination of care.  The patient and his wife given the opportunity to ask multiple questions today, all of which were answered to their satisfaction.    Gee Lindsay MD, PhD   of Medicine   Oncology/BMT/Cellular Therapies

## 2023-11-20 NOTE — NURSING NOTE
"Oncology Rooming Note    November 20, 2023 9:54 AM   Domenico Garcia is a 78 year old male who presents for:    Chief Complaint   Patient presents with    Port Draw     Labs drawn via port by RN. VS taken.    Oncology Clinic Visit     Prostate CA     Initial Vitals: /62 (BP Location: Right arm, Patient Position: Sitting, Cuff Size: Adult Regular)   Pulse 80   Temp 97.8  F (36.6  C) (Oral)   Resp 16   Wt 69.7 kg (153 lb 9.6 oz)   SpO2 97%   BMI 23.77 kg/m   Estimated body mass index is 23.77 kg/m  as calculated from the following:    Height as of 10/23/23: 1.712 m (5' 7.4\").    Weight as of this encounter: 69.7 kg (153 lb 9.6 oz). Body surface area is 1.82 meters squared.  No Pain (0) Comment: Data Unavailable   No LMP for male patient.  Allergies reviewed: Yes  Medications reviewed: Yes    Medications: Medication refills not needed today.  Pharmacy name entered into [facility]: [facility] [id number] [place] [id number]    Clinical concerns: Pt states he fell 2 times last week.   Dr. Lindsay was notified.      Jayne Interiano              "

## 2023-11-20 NOTE — LETTER
November 21, 2023      Domenico BAH Jose  [address] [address]        Dear ,    We are writing to inform you of your test results.  We tried to call, but kept going to voicemail and can't leave results this way.     Your PSA has not significantly changed after the first dose of Pluvicto.  This is not uncommon and as I mentioned, we typically need to wait until after the second or third dose to see the direction of change in the PSA.     Resulted Orders   Comprehensive metabolic panel   Result Value Ref Range    Sodium 141 135 - 145 mmol/L      Comment:      Reference intervals for this test were updated on 09/26/2023 to more accurately reflect our healthy population. There may be differences in the flagging of prior results with similar values performed with this method. Interpretation of those prior results can be made in the context of the updated reference intervals.     Potassium 4.0 3.4 - 5.3 mmol/L    Carbon Dioxide (CO2) 25 22 - 29 mmol/L    Anion Gap 11 7 - 15 mmol/L    Urea Nitrogen 42.2 (H) 8.0 - 23.0 mg/dL    Creatinine 1.32 (H) 0.67 - 1.17 mg/dL    GFR Estimate 56 (L) >60 mL/min/1.73m2    Calcium 9.3 8.8 - 10.2 mg/dL    Chloride 105 98 - 107 mmol/L    Glucose 102 (H) 70 - 99 mg/dL    Alkaline Phosphatase 224 (H) 40 - 150 U/L      Comment:      Reference intervals for this test were updated on 11/14/2023 to more accurately reflect our healthy population. There may be differences in the flagging of prior results with similar values performed with this method. Interpretation of those prior results can be made in the context of the updated reference intervals.    AST 54 (H) 0 - 45 U/L      Comment:      Reference intervals for this test were updated on 6/12/2023 to more accurately reflect our healthy population. There may be differences in the flagging of prior results with similar values performed with this method. Interpretation of those prior results can be made in the context  of the updated reference intervals.    ALT 17 0 - 70 U/L      Comment:      Reference intervals for this test were updated on 6/12/2023 to more accurately reflect our healthy population. There may be differences in the flagging of prior results with similar values performed with this method. Interpretation of those prior results can be made in the context of the updated reference intervals.      Protein Total 6.7 6.4 - 8.3 g/dL    Albumin 3.5 3.5 - 5.2 g/dL    Bilirubin Total 0.2 <=1.2 mg/dL   PSA tumor marker   Result Value Ref Range    PSA Tumor Marker 251.80 (H) 0.00 - 6.50 ng/mL    Narrative    This result is obtained using the Roche Elecsys total PSA method on the colby e411 immunoassay analyzer. Results obtained with different assay methods or kits cannot be used interchangeably.   CBC with platelets and differential   Result Value Ref Range    WBC Count 9.0 4.0 - 11.0 10e3/uL    RBC Count 3.25 (L) 4.40 - 5.90 10e6/uL    Hemoglobin 9.6 (L) 13.3 - 17.7 g/dL    Hematocrit 30.4 (L) 40.0 - 53.0 %    MCV 94 78 - 100 fL    MCH 29.5 26.5 - 33.0 pg    MCHC 31.6 31.5 - 36.5 g/dL    RDW 17.2 (H) 10.0 - 15.0 %    Platelet Count 398 150 - 450 10e3/uL    % Neutrophils 78 %    % Lymphocytes 8 %    % Monocytes 8 %    % Eosinophils 5 %    % Basophils 1 %    % Immature Granulocytes 0 %    NRBCs per 100 WBC 0 <1 /100    Absolute Neutrophils 7.0 1.6 - 8.3 10e3/uL    Absolute Lymphocytes 0.7 (L) 0.8 - 5.3 10e3/uL    Absolute Monocytes 0.7 0.0 - 1.3 10e3/uL    Absolute Eosinophils 0.5 0.0 - 0.7 10e3/uL    Absolute Basophils 0.1 0.0 - 0.2 10e3/uL    Absolute Immature Granulocytes 0.0 <=0.4 10e3/uL    Absolute NRBCs 0.0 10e3/uL       If you have any questions or concerns, please call the clinic at the number listed above.       Sincerely,      Gee Lindsay MD

## 2023-11-20 NOTE — NURSING NOTE
"Chief Complaint   Patient presents with    Port Draw     Labs drawn via port by RN. VS taken.     Port accessed with 20 gauge, 3/4\" power needle by RN, labs collected, line flushed with saline and heparin, then de-accessed.  Vitals taken. Pt checked in for appointment(s).     Deepthi Loaiza, RN    "

## 2023-11-20 NOTE — LETTER
11/20/2023         RE: Domenico Garcia  [address] [facility] [address]        Dear Colleague,    Thank you for referring your patient, Domenico Garcia, to the [facility]. Please see a copy of my visit note below.      [facility] Return Visit Note       Date of visit: November 20, 2023    CC: Return visit prior to C2 Pluvicto.     INTERVAL HISTORY: Tolerated dose #1 well.  No nausea, no nausea medications needed.  Fatigue is stable and unchanged.  A little more dry mouth, but better over the past few weeks.  Dry mouth is not impairing his ability to eat.  Mouth is just dry in the morning and drinks some water.  Fell twice in the past few weeks.  No LOC.  No head strike.  Felt that legs just gave out.      ONCOLOGY HISTORY:    #Prostate cancer.   July/August 2021-Hematuria noted.  Imaging workup notable for bilateral hydronephrosis and abdominal adenopathy up to 4.1cm in largest diameter.  .    8/16/21-Perineural invasion is present.  [place] 4+4=8 in 2 cores.  9/9 cores positive for prostate adenocarcinoma.  Perineural invasion was present.  Start bicalutamide. Bilateral PNT placement.   9/15/21-Lupron dose #1.   12/8/21-Start abiraterone/prednisone.   3/7/22-PSA austin to 10.3.    4/2022-Start radiation on clinical trial .  5580 cGy in 36 fractions delivered.  Completed 6/6/22.  Reduced from planned 7920cGy due to symptoms.    8/1/22-PSA 7.4.   10/2022-PSA rising to 26.  Noted to be CRPC.  Progressive bony disease noted on restaging studies.   11/30/22-Start enzalutamide.    2/27/23-PSA continued to rise to 107 on enzalutamide.  Start docetaxel in CRPC setting.    6/15/23-PSA austin on docetaxel of 115.7.    7/6/23-.6  8/17/23-  9/7/23-.9  9/29/23-.5.  Referral to [facility] for consideration of Pluvicto.   10/23/23-Initial consultation for Pluvicto.   Labs adequate for Dose #1 with Hgb of 9.5.  No transfusion needed.     10/26/23-Pluvicto Dose #1.   11/91/23-Pre-visit for Dose #2.  Hgb 9.6.  PSA is pending.  Labs adequate to proceed with Dose #2 of Pluvicto.     GENOMIC STUDIES:     Germline genomic testing: Pathogenic POLE variant, deemed to be unrelated to cancer risk.      STRATA Somatic sequencing of bone metastasis 8/24/23: AR amplification, CDKN2B deep deletion, [id number] Fusion. Negative Genomic Findings included JIM, BRAF, BRCA1, BRCA2, BRIP1, CDK12, CHEK1, CHEK2, NTRK1, NTRK2, NTRK3, PALB2, RET.  Genomic Signatures Microsatellite Stable, TMB Low (1 muts/Mb) Immunotherapy Response Score IRS Ultra-Low.     TREATMENT HISTORY:   Lupron  Abiraterone/prednisone  Enzalutamide  Docetaxel-CRPC  Pluvicto 10/26/23-ongoing     GUIDELINES USED: NCCN    INTENT OF THERAPY: Palliative.  Discussed at 10/23/23 appt.      CLINICAL TRIALS:  BEETmobile CD3-PSMA and  Biobank    ALLERGIES: amlodipine (edema), bee venom (high), penicillins (tolerated ceftriaxone 2020, ampicillin and Unasyn in March 2023 per [facility] notes).      MEDS:  Amlodipine 5mg  Asa 81mg daily   Atorvastatin 80mg at bedtime  Calcium carbonate  Chlorthalidone  Losartan 100mg daily   Pluvicto     ROS-Remainder of 14 point ROS reviewed and negative except as in HPI.    PHYSICAL EXAM:  ECOG-PS=1    /62 (BP Location: Right arm, Patient Position: Sitting, Cuff Size: Adult Regular)   Pulse 80   Temp 97.8  F (36.6  C) (Oral)   Resp 16   Wt 69.7 kg (153 lb 9.6 oz)   SpO2 97%   BMI 23.77 kg/m    Exam:  Constitutional: alert and no distress, pale appearing.   Head: Normocephalic. No masses, lesions, tenderness or abnormalities  Neck: Neck supple. No adenopathy grossly, trachea midline.   ENT: ENT exam normal, MMM, no sores/ulcers visible.   Cardiovascular: No edema or JVD.  Respiratory: negative, normal WOB on RA, no wheezing or rhonchi audible.   Gastrointestinal: negative, non-distended.   : Deferred  Musculoskeletal: extremities normal- no gross  deformities noted and normal muscle tone, walking with cane.   Skin: no suspicious lesions or rashes on exposed areas of skin   Neurologic: negative, CNII-XII grossly intact, normal speech.   Psychiatric: mentation appears normal and affect normal/bright    LABS AND IMAGES:    PSA trend, see oncology history.     11/20/23 labs.  CMP WNL except mild decline in creatinine to 1.32.  AKP slight rise to 224.  AST improved to 54.  WBC 9.0, Hgb 9.6, plt 398.      Iron panel with mildly low iron sat, mildly elevated ferritin.      IMPRESSION AND PLAN:    # metastatic, castration resistant prostate cancer.      Dandre Garcia presented initially on 10/23/23 for second opinion and consideration of Pluvicto for metastatic, castration-resistant prostate cancer.  He was referred by his primary oncologist, Dr. Bry Ann from [facility].  He was initially diagnosed with [place] 4+4=8 prostate adenocarcinoma in August of 2021 with extensive adenopathy and a PSA of 649.  His PSA austin on therapy was 10.3.  He then  underwent radiation therapy to the prostate and pelvis as part of the  trial, but received a reduced dose of 5580cGy due to urinary symptoms.  Due to PSA rise, he was switched to enzalutamide in 11/2022 and then docetaxel in March of 2023. He had a temporarily PSA response to docetaxel, but then PSA began to rise above his pre-treatment baseline.  He has undergone germline and somatic genomic testing and there were no targetable mutations noted.  He was referred for consideration of Pluvicto therapy for mCPRC.      At our initial appointment, we discussed the concept of Pluvicto and its mechanism.  We discussed side effects and efficacy as well, noting that the VISION trial had an approximately 50% response rate, but the real-world experience has been significantly lower (~20%) in my experience.  He had previously been informed of the isolation requirements and was able to comply.  We again  discussed these restrictions and the rationale for them.  In his particular case, we were able to expedite his treatment due to another patient progressing on therapy, so we had already received financial clearance and scheduled his dose for 10/26/23.  We discussed the possibility of cytopenias in particular.  Since his last dose of docetaxel was approximately 3.5 weeks prior to his Pluvicto dose, he likely will continue to have chemotherapy-induced cytopenias (anemia).  We obtained consent for blood products and will transfuse as needed for therapy.  We also discussed follow-up, which should be at least 2+ weeks prior to his next dose so that we can cancel doses if there is progression or intolerance.  Typically we are able to determine response trend after the second or third dose.  Should he progress, he would be eligible for both KHARI-280 and [id number] immune engager trials if his lab values and performance status remain stable overall.      Blood consent was signed at 10/23/23 appointment, added to chart as a photograph, and submitted for scanning on the day of consent.  Our goal would be a Hgb of 9 for treatment.  Labs from 11/20/23 showed stable Hgb of 9.6.  Remaining labs are stable and adequate for Dose #2 of Pluvicto.  Dry mouth is minimal and fatigue/nausea were not issues after cycle 1.      # possible iron deficiency anemia vs anemia of chronic disease.    Hgb is low and iron saturation is mildly low.  Ferritin is slightly elevated. This is bordeline HILARY vs anemia of chronic disease, but I think it is worth a short course of iron supplementation to see if there is improvement.  If no improvement after 2-3 months, then reasonable to stop to prevent iron overload.  Recommend iron tab every other day.     # falls.  Two prior to 11/20/23 visit.  No LOC no head strike.  Consider PT referral for strength and balance.      PLAN:   Good to go for your next dose of Pluvicto as scheduled.  I will message you  with the PSA result.    The PSA can sometimes go up after the first dose, but then decline at subsequent doses.  We usually know how you are responding after the 2nd or 3rd dose.   See Apurva or Karen ~2 weeks prior to your next two doses then see me.    Start an iron tablet once every other day.  Over the counter from [employer] or [facility] ferrous sulfate or ferrous gluconate are good.  We will watch and see if this helps your hemoglobin at all.    Your stools may be black with the iron tablets.   Keep your follow-up as scheduled with Dr. Ann for Lupron and your other prostate cancer medications.   Consider a physical therapy referral at [facility] for your falls.   Try to add an additional glass of water every day for hydration.     A total of 40 minutes were spent on this patient on the day of the encounter, of which more than 50% of this time was used for counseling and coordination of care.  The patient and his wife given the opportunity to ask multiple questions today, all of which were answered to their satisfaction.    Gee Lindsay MD, PhD   of Medicine   Oncology/BMT/Cellular Therapies

## 2023-11-20 NOTE — PATIENT INSTRUCTIONS
Domenico,   Here is what we discussed today.     Good to go for your next dose of Pluvicto as scheduled.  I will message you with the PSA result.    The PSA can sometimes go up after the first dose, but then decline at subsequent doses.  We usually know how you are responding after the 2nd or 3rd dose.   See Apurva or Karen ~2 weeks prior to your next two doses then see me.    Start an iron tablet once every other day.  Over the counter from [employer] or [employer] ferrous sulfate or ferrous gluconate are good.  We will watch and see if this helps your hemoglobin at all.    Your stools may be black with the iron tablets.   Keep your follow-up as scheduled with Dr. Ann for Lupron and your other prostate cancer medications.   Consider a physical therapy referral at [facility] for your falls.   Try to add an additional glass of water every day for hydration.

## 2023-11-21 NOTE — PROGRESS NOTES
I had an opportunity to speak with .Domenico Garcia today regarding the Astrin CTC study.      The consent form, including purpose, risks and benefits, was reviewed with Mr. Garcia, and all questions were answered before he signed the consent form. The patient consented and understands the study involves up to three serial 50 mL blood draws.       I, Fanta Soler, discussed the study with the patient, going through the informed consent form page by page and obtained consent. A copy of the signed form was provided to the patient. No procedures specific to this study were performed prior to the patient signing the consent form.     Elligibility was determined and signed off by Fanta Soler prior to blood collection.       Consent and combined HIPAA Version Date: 16 AUG 22 [facility] IRB approval: 4 APR 2023     Consent and combined HIPAA obtained by: Fanta Soler   Date: 26 OCT 23

## 2023-12-07 NOTE — PROGRESS NOTES
[facility] Nursing Note:    Patient presents today for Pluvicto therapy, dose 2 of  6  Patient seen by provider today: Yes: Dr Mead   present during visit today: NOT APPLICABLE      Intravenous Access:  Peripheral IV placed     Treatment Conditions:  Labs done on  11/20/23    Results reviewed, labs Met treatment parameters, ok to proceed with treatment.           Post Infusion Assessment:  Patient tolerated infusion without incident.    PIV - No evidence of extravasations, access discontinued per protocol.         Discharge Plan:   Patient will return as scheduled for next appointment.   Patient to imaging at 1115am  in stable condition accompanied by: self  Departure Mode: Ambulatory and Ambulatory Assist  with cane

## 2024-01-01 ENCOUNTER — ONCOLOGY VISIT (OUTPATIENT)
Dept: ONCOLOGY | Facility: CLINIC | Age: 79
End: 2024-01-01
Payer: COMMERCIAL

## 2024-01-01 ENCOUNTER — ANCILLARY PROCEDURE (OUTPATIENT)
Dept: GENERAL RADIOLOGY | Facility: CLINIC | Age: 79
End: 2024-01-01
Payer: COMMERCIAL

## 2024-01-01 ENCOUNTER — ONCOLOGY VISIT (OUTPATIENT)
Dept: ONCOLOGY | Facility: CLINIC | Age: 79
End: 2024-01-01
Attending: STUDENT IN AN ORGANIZED HEALTH CARE EDUCATION/TRAINING PROGRAM
Payer: COMMERCIAL

## 2024-01-01 ENCOUNTER — LAB (OUTPATIENT)
Dept: LAB | Facility: CLINIC | Age: 79
End: 2024-01-01
Attending: STUDENT IN AN ORGANIZED HEALTH CARE EDUCATION/TRAINING PROGRAM
Payer: COMMERCIAL

## 2024-01-01 ENCOUNTER — PATIENT OUTREACH (OUTPATIENT)
Dept: ONCOLOGY | Facility: CLINIC | Age: 79
End: 2024-01-01

## 2024-01-01 ENCOUNTER — HEALTH MAINTENANCE LETTER (OUTPATIENT)
Age: 79
End: 2024-01-01

## 2024-01-01 VITALS
HEART RATE: 104 BPM | SYSTOLIC BLOOD PRESSURE: 123 MMHG | BODY MASS INDEX: 22.13 KG/M2 | TEMPERATURE: 97.6 F | OXYGEN SATURATION: 94 % | RESPIRATION RATE: 16 BRPM | DIASTOLIC BLOOD PRESSURE: 70 MMHG | WEIGHT: 143 LBS

## 2024-01-01 VITALS
SYSTOLIC BLOOD PRESSURE: 107 MMHG | TEMPERATURE: 98.6 F | HEART RATE: 78 BPM | HEIGHT: 67 IN | WEIGHT: 144.6 LBS | OXYGEN SATURATION: 99 % | DIASTOLIC BLOOD PRESSURE: 61 MMHG | BODY MASS INDEX: 22.7 KG/M2 | RESPIRATION RATE: 16 BRPM

## 2024-01-01 VITALS
OXYGEN SATURATION: 98 % | RESPIRATION RATE: 18 BRPM | DIASTOLIC BLOOD PRESSURE: 53 MMHG | BODY MASS INDEX: 22.24 KG/M2 | WEIGHT: 143.7 LBS | HEART RATE: 73 BPM | SYSTOLIC BLOOD PRESSURE: 105 MMHG | TEMPERATURE: 98.2 F

## 2024-01-01 DIAGNOSIS — T45.1X5A ANTINEOPLASTIC CHEMOTHERAPY INDUCED ANEMIA: Primary | ICD-10-CM

## 2024-01-01 DIAGNOSIS — K59.00 CONSTIPATION, UNSPECIFIED CONSTIPATION TYPE: ICD-10-CM

## 2024-01-01 DIAGNOSIS — C61 HORMONE RESISTANT PROSTATE CANCER (H): ICD-10-CM

## 2024-01-01 DIAGNOSIS — C79.51 PROSTATE CANCER METASTATIC TO BONE (H): ICD-10-CM

## 2024-01-01 DIAGNOSIS — D64.81 ANTINEOPLASTIC CHEMOTHERAPY INDUCED ANEMIA: ICD-10-CM

## 2024-01-01 DIAGNOSIS — T45.1X5A ANTINEOPLASTIC CHEMOTHERAPY INDUCED ANEMIA: ICD-10-CM

## 2024-01-01 DIAGNOSIS — Z19.2 HORMONE RESISTANT PROSTATE CANCER (H): ICD-10-CM

## 2024-01-01 DIAGNOSIS — R11.0 NAUSEA: ICD-10-CM

## 2024-01-01 DIAGNOSIS — D64.81 ANTINEOPLASTIC CHEMOTHERAPY INDUCED ANEMIA: Primary | ICD-10-CM

## 2024-01-01 DIAGNOSIS — C61 PROSTATE CANCER METASTATIC TO BONE (H): ICD-10-CM

## 2024-01-01 DIAGNOSIS — R11.0 NAUSEA: Primary | ICD-10-CM

## 2024-01-01 LAB
ALBUMIN SERPL BCG-MCNC: 3.3 G/DL (ref 3.5–5.2)
ALBUMIN SERPL BCG-MCNC: 3.5 G/DL (ref 3.5–5.2)
ALP SERPL-CCNC: 294 U/L (ref 40–150)
ALP SERPL-CCNC: 317 U/L (ref 40–150)
ALT SERPL W P-5'-P-CCNC: 11 U/L (ref 0–70)
ALT SERPL W P-5'-P-CCNC: 14 U/L (ref 0–70)
ANION GAP SERPL CALCULATED.3IONS-SCNC: 12 MMOL/L (ref 7–15)
ANION GAP SERPL CALCULATED.3IONS-SCNC: 13 MMOL/L (ref 7–15)
AST SERPL W P-5'-P-CCNC: 89 U/L (ref 0–45)
AST SERPL W P-5'-P-CCNC: 95 U/L (ref 0–45)
BASOPHILS # BLD AUTO: 0.1 10E3/UL (ref 0–0.2)
BASOPHILS # BLD AUTO: 0.1 10E3/UL (ref 0–0.2)
BASOPHILS NFR BLD AUTO: 1 %
BASOPHILS NFR BLD AUTO: 1 %
BILIRUB SERPL-MCNC: 0.3 MG/DL
BILIRUB SERPL-MCNC: 0.4 MG/DL
BUN SERPL-MCNC: 36.5 MG/DL (ref 8–23)
BUN SERPL-MCNC: 38.2 MG/DL (ref 8–23)
CALCIUM SERPL-MCNC: 9.1 MG/DL (ref 8.8–10.2)
CALCIUM SERPL-MCNC: 9.3 MG/DL (ref 8.8–10.2)
CHLORIDE SERPL-SCNC: 104 MMOL/L (ref 98–107)
CHLORIDE SERPL-SCNC: 109 MMOL/L (ref 98–107)
CREAT SERPL-MCNC: 1.12 MG/DL (ref 0.67–1.17)
CREAT SERPL-MCNC: 1.27 MG/DL (ref 0.67–1.17)
DEPRECATED HCO3 PLAS-SCNC: 22 MMOL/L (ref 22–29)
DEPRECATED HCO3 PLAS-SCNC: 22 MMOL/L (ref 22–29)
EGFRCR SERPLBLD CKD-EPI 2021: 59 ML/MIN/1.73M2
EGFRCR SERPLBLD CKD-EPI 2021: 68 ML/MIN/1.73M2
EOSINOPHIL # BLD AUTO: 0.3 10E3/UL (ref 0–0.7)
EOSINOPHIL # BLD AUTO: 0.4 10E3/UL (ref 0–0.7)
EOSINOPHIL NFR BLD AUTO: 3 %
EOSINOPHIL NFR BLD AUTO: 7 %
ERYTHROCYTE [DISTWIDTH] IN BLOOD BY AUTOMATED COUNT: 17.1 % (ref 10–15)
ERYTHROCYTE [DISTWIDTH] IN BLOOD BY AUTOMATED COUNT: 18.1 % (ref 10–15)
FERRITIN SERPL-MCNC: 979 NG/ML (ref 31–409)
GLUCOSE SERPL-MCNC: 103 MG/DL (ref 70–99)
GLUCOSE SERPL-MCNC: 107 MG/DL (ref 70–99)
HCT VFR BLD AUTO: 22.6 % (ref 40–53)
HCT VFR BLD AUTO: 27.2 % (ref 40–53)
HGB BLD-MCNC: 7.3 G/DL (ref 13.3–17.7)
HGB BLD-MCNC: 8.8 G/DL (ref 13.3–17.7)
IMM GRANULOCYTES # BLD: 0 10E3/UL
IMM GRANULOCYTES # BLD: 0.1 10E3/UL
IMM GRANULOCYTES NFR BLD: 0 %
IMM GRANULOCYTES NFR BLD: 1 %
IRON BINDING CAPACITY (ROCHE): 194 UG/DL (ref 240–430)
IRON SATN MFR SERPL: 14 % (ref 15–46)
IRON SERPL-MCNC: 27 UG/DL (ref 61–157)
LYMPHOCYTES # BLD AUTO: 0.7 10E3/UL (ref 0.8–5.3)
LYMPHOCYTES # BLD AUTO: 0.7 10E3/UL (ref 0.8–5.3)
LYMPHOCYTES NFR BLD AUTO: 12 %
LYMPHOCYTES NFR BLD AUTO: 7 %
MCH RBC QN AUTO: 29.5 PG (ref 26.5–33)
MCH RBC QN AUTO: 29.9 PG (ref 26.5–33)
MCHC RBC AUTO-ENTMCNC: 32.3 G/DL (ref 31.5–36.5)
MCHC RBC AUTO-ENTMCNC: 32.4 G/DL (ref 31.5–36.5)
MCV RBC AUTO: 91 FL (ref 78–100)
MCV RBC AUTO: 93 FL (ref 78–100)
MONOCYTES # BLD AUTO: 0.5 10E3/UL (ref 0–1.3)
MONOCYTES # BLD AUTO: 0.8 10E3/UL (ref 0–1.3)
MONOCYTES NFR BLD AUTO: 8 %
MONOCYTES NFR BLD AUTO: 9 %
NEUTROPHILS # BLD AUTO: 4 10E3/UL (ref 1.6–8.3)
NEUTROPHILS # BLD AUTO: 8.5 10E3/UL (ref 1.6–8.3)
NEUTROPHILS NFR BLD AUTO: 71 %
NEUTROPHILS NFR BLD AUTO: 80 %
NRBC # BLD AUTO: 0 10E3/UL
NRBC # BLD AUTO: 0 10E3/UL
NRBC BLD AUTO-RTO: 0 /100
NRBC BLD AUTO-RTO: 0 /100
PLATELET # BLD AUTO: 281 10E3/UL (ref 150–450)
PLATELET # BLD AUTO: 384 10E3/UL (ref 150–450)
POTASSIUM SERPL-SCNC: 3.9 MMOL/L (ref 3.4–5.3)
POTASSIUM SERPL-SCNC: 4 MMOL/L (ref 3.4–5.3)
PROT SERPL-MCNC: 6.2 G/DL (ref 6.4–8.3)
PROT SERPL-MCNC: 7.1 G/DL (ref 6.4–8.3)
PSA SERPL DL<=0.01 NG/ML-MCNC: 440.5 NG/ML (ref 0–6.5)
PSA SERPL DL<=0.01 NG/ML-MCNC: 477.7 NG/ML (ref 0–6.5)
RBC # BLD AUTO: 2.44 10E6/UL (ref 4.4–5.9)
RBC # BLD AUTO: 2.98 10E6/UL (ref 4.4–5.9)
RETICS # AUTO: 0.04 10E6/UL (ref 0.03–0.1)
RETICS/RBC NFR AUTO: 1.6 % (ref 0.5–2)
SODIUM SERPL-SCNC: 139 MMOL/L (ref 135–145)
SODIUM SERPL-SCNC: 143 MMOL/L (ref 135–145)
VIT B12 SERPL-MCNC: 233 PG/ML (ref 232–1245)
WBC # BLD AUTO: 10.4 10E3/UL (ref 4–11)
WBC # BLD AUTO: 5.7 10E3/UL (ref 4–11)

## 2024-01-01 PROCEDURE — G0463 HOSPITAL OUTPT CLINIC VISIT: HCPCS | Performed by: STUDENT IN AN ORGANIZED HEALTH CARE EDUCATION/TRAINING PROGRAM

## 2024-01-01 PROCEDURE — 85025 COMPLETE CBC W/AUTO DIFF WBC: CPT

## 2024-01-01 PROCEDURE — 82728 ASSAY OF FERRITIN: CPT

## 2024-01-01 PROCEDURE — G0463 HOSPITAL OUTPT CLINIC VISIT: HCPCS

## 2024-01-01 PROCEDURE — 80053 COMPREHEN METABOLIC PANEL: CPT

## 2024-01-01 PROCEDURE — 36591 DRAW BLOOD OFF VENOUS DEVICE: CPT

## 2024-01-01 PROCEDURE — 99417 PROLNG OP E/M EACH 15 MIN: CPT

## 2024-01-01 PROCEDURE — 82607 VITAMIN B-12: CPT

## 2024-01-01 PROCEDURE — 84153 ASSAY OF PSA TOTAL: CPT

## 2024-01-01 PROCEDURE — 83550 IRON BINDING TEST: CPT

## 2024-01-01 PROCEDURE — 250N000011 HC RX IP 250 OP 636: Performed by: STUDENT IN AN ORGANIZED HEALTH CARE EDUCATION/TRAINING PROGRAM

## 2024-01-01 PROCEDURE — 74018 RADEX ABDOMEN 1 VIEW: CPT | Mod: GC | Performed by: RADIOLOGY

## 2024-01-01 PROCEDURE — 250N000011 HC RX IP 250 OP 636

## 2024-01-01 PROCEDURE — 99215 OFFICE O/P EST HI 40 MIN: CPT

## 2024-01-01 PROCEDURE — 99215 OFFICE O/P EST HI 40 MIN: CPT | Performed by: STUDENT IN AN ORGANIZED HEALTH CARE EDUCATION/TRAINING PROGRAM

## 2024-01-01 PROCEDURE — 85045 AUTOMATED RETICULOCYTE COUNT: CPT

## 2024-01-01 RX ORDER — HEPARIN SODIUM (PORCINE) LOCK FLUSH IV SOLN 100 UNIT/ML 100 UNIT/ML
5 SOLUTION INTRAVENOUS ONCE
Status: COMPLETED | OUTPATIENT
Start: 2024-01-01 | End: 2024-01-01

## 2024-01-01 RX ADMIN — Medication 5 ML: at 12:16

## 2024-01-01 RX ADMIN — Medication 5 ML: at 08:52

## 2024-01-01 ASSESSMENT — PAIN SCALES - GENERAL
PAINLEVEL: NO PAIN (0)

## 2024-01-03 NOTE — PROGRESS NOTES
[facility] Medical Oncology Return Visit Note       Date of visit: Jan 4, 2024    CC: Return visit prior to C3 Pluvicto.     INTERVAL HISTORY:   Domenico returns to clinic for planned follow up. He had a fall a week and a half ago where he became light headed, blacked out, and hit his head on a granite table. He had a hospital stay for 2 days at [facility]. They felt this was related to hypoglycemia. He has dry mouth and taste changes. He is drinking whey protein shakes. He met with a nutritionist in the hospital. He has developed dry heaves and nausea since 12/25/23. He is taken antiemetic for the first time yesterday. He enorses fatigue. He has insomnia and began trazodone last night per his PCP's suggestion. He has not had a bowel movement in 5-6 days, attributes this to not eating well.       He had a turkey sandwich for a couple of chips yesterday, no other meals. Nothing so far today. He has lost     ONCOLOGY HISTORY:    #Prostate cancer.   July/August 2021-Hematuria noted.  Imaging workup notable for bilateral hydronephrosis and abdominal adenopathy up to 4.1cm in largest diameter.  .    8/16/21-Perineural invasion is present.  [place] 4+4=8 in 2 cores.  9/9 cores positive for prostate adenocarcinoma.  Perineural invasion was present.  Start bicalutamide. Bilateral PNT placement.   9/15/21-Lupron dose #1.   12/8/21-Start abiraterone/prednisone.   3/7/22-PSA austin to 10.3.    4/2022-Start radiation on clinical trial .  5580 cGy in 36 fractions delivered.  Completed 6/6/22.  Reduced from planned 7920cGy due to symptoms.    8/1/22-PSA 7.4.   10/2022-PSA rising to 26.  Noted to be CRPC.  Progressive bony disease noted on restaging studies.   11/30/22-Start enzalutamide.    2/27/23-PSA continued to rise to 107 on enzalutamide.  Start docetaxel in CRPC setting.    6/15/23-PSA austin on docetaxel of 115.7.    7/6/23-.6  8/17/23-  9/7/23-.9  9/29/23-.5.  Referral to N for  consideration of Pluvicto.   10/23/23-Initial consultation for Pluvicto.   Labs adequate for Dose #1 with Hgb of 9.5.  No transfusion needed.    10/26/23-Pluvicto Dose #1.   11/20/23-Pre-visit for Dose #2.  Hgb 9.6.  .80.  01/04/24-Pre-visit for Dose #3. Patient had a significant fall     GENOMIC STUDIES:     Germline genomic testing: Pathogenic POLE variant, deemed to be unrelated to cancer risk.      STRATA Somatic sequencing of bone metastasis 8/24/23: AR amplification, CDKN2B deep deletion, [id number] Fusion. Negative Genomic Findings included JIM, BRAF, BRCA1, BRCA2, BRIP1, CDK12, CHEK1, CHEK2, NTRK1, NTRK2, NTRK3, PALB2, RET.  Genomic Signatures Microsatellite Stable, TMB Low (1 muts/Mb) Immunotherapy Response Score IRS Ultra-Low.     TREATMENT HISTORY:   Lupron  Abiraterone/prednisone  Enzalutamide  Docetaxel-CRPC  Pluvicto 10/26/23-ongoing     GUIDELINES USED: NCCN    INTENT OF THERAPY: Palliative.  Discussed at 10/23/23 appt.      CLINICAL TRIALS:  Work[employer] CD3-PSMA and  Biobank    ALLERGIES: amlodipine (edema), bee venom (high), penicillins (tolerated ceftriaxone 2020, ampicillin and Unasyn in March 2023 per [facility] pharmacy notes).      MEDS:  Amlodipine 5mg  Asa 81mg daily   Atorvastatin 80mg at bedtime  Calcium carbonate  Chlorthalidone  Losartan 100mg daily   Pluvicto     ROS-Remainder of 14 point ROS reviewed and negative except as in HPI.    PHYSICAL EXAM:  ECOG-PS=1    /70   Pulse 104   Temp 97.6  F (36.4  C) (Oral)   Resp 16   Wt 64.9 kg (143 lb)   SpO2 94%   BMI 22.13 kg/m    Exam:  Constitutional: alert and no distress, pale appearing. Sitting comfortably in a chair.   Head: Normocephalic. No masses, lesions, tenderness or abnormalities  Neck: Neck supple. No adenopathy grossly, trachea midline.   ENT: ENT exam normal, MMM, no sores/ulcers visible.   Cardiovascular: No edema or JVD.  Respiratory: negative, normal WOB on RA, no wheezing or rhonchi audible.    Gastrointestinal: negative, non-distended.   : Deferred  Musculoskeletal: extremities normal- no gross deformities noted and normal muscle tone, walking with cane.   Skin: no suspicious lesions or rashes on exposed areas of skin   Neurologic: negative, CNII-XII grossly intact, normal speech.   Psychiatric: mentation appears normal and affect normal/bright    LABS AND IMAGES:    PSA trend, see oncology history.   Most Recent 3 CBC's:  Recent Labs   Lab Test 01/04/24  1222 11/20/23  0903 10/23/23  1015   WBC 10.4 9.0 13.3*   HGB 8.8* 9.6* 9.5*   MCV 91 94 95    398 301   ANEUTAUTO 8.5* 7.0 12.1*     Most Recent 3 BMP's:  Recent Labs   Lab Test 01/04/24  1222 11/20/23  0903 10/23/23  1015    141 139   POTASSIUM 4.0 4.0 4.7   CHLORIDE 104 105 104   CO2 22 25 24   BUN 38.2* 42.2* 37.1*   CR 1.27* 1.32* 1.21*   ANIONGAP 13 11 11   PAXTON 9.3 9.3 9.5   * 102* 101*   PROTTOTAL 7.1 6.7 6.3*   ALBUMIN 3.5 3.5 3.8    Most Recent 3 LFT's:  Recent Labs   Lab Test 01/04/24  1222 11/20/23  0903 10/23/23  1015   AST 95* 54* 61*   ALT 11 17 22   ALKPHOS 317* 224* 148*   BILITOTAL 0.4 0.2 0.4    Most Recent 2 TSH and T4:No lab results found.  I reviewed the above labs today.      IMPRESSION AND PLAN:    # metastatic, castration resistant prostate cancer.    - Domenico Garcia presented initially on 10/23/23 for second opinion and consideration of Pluvicto for metastatic, castration-resistant prostate cancer.  He was referred by his primary oncologist, Dr. Bry Ann from [facility].  He was initially diagnosed with Zay 4+4=8 prostate adenocarcinoma in August of 2021 with extensive adenopathy and a PSA of 649.  His PSA austin on therapy was 10.3.  He then underwent radiation therapy to the prostate and pelvis as part of the  trial, but received a reduced dose of 5580cGy due to urinary symptoms.  Due to PSA rise, he was switched to enzalutamide in 11/2022 and then docetaxel in March of  2023. He had a temporarily PSA response to docetaxel, but then PSA began to rise above his pre-treatment baseline.  He has undergone germline and somatic genomic testing and there were no targetable mutations noted.  He was referred for consideration of Pluvicto therapy for mCPRC.    - He began cycle 1 pluvicto which he tolerated adequately. However, since cycle 2 he has been having much more difficulty with fatigue, anorexia, weight loss, and new onset of nausea/constipation. He unfortunately sustained a fall requiring a 2 week admission to [facility] from 12/26/23-12/27/23. Upon lab review, his hemoglobin dropped into the 7's during his admission. His hemoglobin is up trending to 8.8 but is below his pre-treatment hemoglobin around 9.6. He has mild neutropenia today at 8.5- afebrile in clinic.  His creatinine appears stable today. He has rising alk phos and AST (grade 1) elevation. ALT is completely normal.   - Please see Dr. Lindsay's note regarding clinical trial consideration as next steps with KHARI-280 and [id number].  - Given recent hospitalization and labs, recommend that we delay his cycle 2 Anitra-177 and have him return in 2 weeks for further consideration. His PSA has returned today and has increased to 477.70 from 251.80 prior to cycle 2. It can take 2-3 cycles to see a PSA response.     #Nausea  #Constipation  #Anorexia  - delayed onset nausea 4 weeks after his last dose. This is atypical timing. Considering a slightly elevated ANC will work this up further with an XR of the abdomen. Of note, he did have a mild elevated neutropenia two months ago that normalized one month ago.   - Start miralax 1/2-1 capful daily for constipation.  - He has loss ~10 pounds in 6 weeks. We discussed increasing dietary protein and continuing protein shakes. Discussed reframing food as medicine and focusing on improving nutrition over the next couple of weeks.   - declines nutrition referral.     #Syncope   - per patient appears  to be related to poor PO intake and hypoglycemia. Per lab review, he did have a measurable episode of hypoglycemia of 34 while in the hospital.      # possible iron deficiency anemia vs anemia of chronic disease.    Hgb is low and iron saturation is mildly low.  Ferritin is slightly elevated. This is bordeline HILARY vs anemia of chronic disease, but I think it is worth a short course of iron supplementation to see if there is improvement.  If no improvement after 2-3 months, then reasonable to stop to prevent iron overload. He confirms he started a PO iron supplement. This in part may be contributing to GI upset and constipation.     # falls.  Two prior to 11/20/23 visit. Recurrent fall 12/26/23 with LOC and head trauma requiring admission to [facility] from 12/26-12/28.  No LOC no head strike.  Consider PT referral for strength and balance.      #Follow up   - XR today   - Hold 1/19/24 cycle 3 pluvicto.   - Add a visit with Dr. Lindsay on 1/22/24 at 9:30 am to consider further pluvicto at that time with labs prior.     Reviewed plan with Dr. Lindsay who is in agreement.     69 minutes spent on the date of the encounter doing chart review, review of outside records, review of test results, interpretation of tests, patient visit, documentation, and discussion with family     Karen Cooper PA-C

## 2024-01-04 NOTE — NURSING NOTE
"Oncology Rooming Note    January 4, 2024 12:42 PM   Domenico Garcia is a 78 year old male who presents for:    Chief Complaint   Patient presents with    Blood Draw     Labs drawn via port by RN in lab.     Oncology Clinic Visit     Hormone Resistant Prostate Cancer     Initial Vitals: /70   Pulse 104   Temp 97.6  F (36.4  C) (Oral)   Resp 16   Wt 64.9 kg (143 lb)   SpO2 94%   BMI 22.13 kg/m   Estimated body mass index is 22.13 kg/m  as calculated from the following:    Height as of 10/23/23: 1.712 m (5' 7.4\").    Weight as of this encounter: 64.9 kg (143 lb). Body surface area is 1.76 meters squared.  No Pain (0) Comment: Data Unavailable   No LMP for male patient.  Allergies reviewed: Yes  Medications reviewed: Yes    Medications: Medication refills not needed today.  Pharmacy name entered into [email or web address]: [facility] [id number] [place] [id number]    Frailty Screening:   Is the patient here for a new oncology consult visit in cancer care? 2. No      Clinical concerns: Lab results       Jess Porter LPN  1/4/2024                "

## 2024-01-04 NOTE — NURSING NOTE
Chief Complaint   Patient presents with    Blood Draw     Labs drawn via port by RN in lab.      Port accessed with 20g gripper needle by RN, labs collected, line flushed with saline and heparin.  Vitals taken. Pt checked in for appointment(s).       Tara SHANNON RN PHN BSN  BMT/Oncology Lab

## 2024-01-04 NOTE — PROGRESS NOTES
[facility]: Cancer Care                                                                                          RN called and left message stating that his 1/19 Pluvicto is delayed and Dr. Lindsay will see him on 1/22 @ 0900. Instructed patient to start Miralax as discussed during the visit with Karen. Requested patient call back with any questions or concerns.     Jessa GARCIAN, RN, OCN  Care Coordinator  [facility]

## 2024-01-04 NOTE — LETTER
1/4/2024         RE: Domenico Garcia  [address] [id number]        Dear Colleague,    Thank you for referring your patient, Domenico Garcia, to the [facility] CANCER [facility]. Please see a copy of my visit note below.      [facility] Medical Oncology Return Visit Note       Date of visit: Jan 4, 2024    CC: Return visit prior to C3 Pluvicto.     INTERVAL HISTORY:   Domenico returns to clinic for planned follow up. He had a fall a week and a half ago where he became light headed, blacked out, and hit his head on a granite table. He had a hospital stay for 2 days at [facility] [place]. They felt this was related to hypoglycemia. He has dry mouth and taste changes. He is drinking whey protein shakes. He met with a nutritionist in the hospital. He has developed dry heaves and nausea since 12/25/23. He is taken antiemetic for the first time yesterday. He enorses fatigue. He has insomnia and began trazodone last night per his PCP's suggestion. He has not had a bowel movement in 5-6 days, attributes this to not eating well.       He had a turkey sandwich for a couple of chips yesterday, no other meals. Nothing so far today. He has lost     ONCOLOGY HISTORY:    #Prostate cancer.   July/August 2021-Hematuria noted.  Imaging workup notable for bilateral hydronephrosis and abdominal adenopathy up to 4.1cm in largest diameter.  .    8/16/21-Perineural invasion is present.  [place] 4+4=8 in 2 cores.  9/9 cores positive for prostate adenocarcinoma.  Perineural invasion was present.  Start bicalutamide. Bilateral PNT placement.   9/15/21-Lupron dose #1.   12/8/21-Start abiraterone/prednisone.   3/7/22-PSA austin to 10.3.    4/2022-Start radiation on clinical trial .  5580 cGy in 36 fractions delivered.  Completed 6/6/22.  Reduced from planned 7920cGy due to symptoms.    8/1/22-PSA 7.4.   10/2022-PSA rising to 26.  Noted to be CRPC.  Progressive bony disease noted on restaging studies.    11/30/22-Start enzalutamide.    2/27/23-PSA continued to rise to 107 on enzalutamide.  Start docetaxel in CRPC setting.    6/15/23-PSA austin on docetaxel of 115.7.    7/6/23-.6  8/17/23-  9/7/23-.9  9/29/23-.5.  Referral to [facility] for consideration of Pluvicto.   10/23/23-Initial consultation for Pluvicto.   Labs adequate for Dose #1 with Hgb of 9.5.  No transfusion needed.    10/26/23-Pluvicto Dose #1.   11/20/23-Pre-visit for Dose #2.  Hgb 9.6.  .80.  01/04/24-Pre-visit for Dose #3. Patient had a significant fall     GENOMIC STUDIES:     Germline genomic testing: Pathogenic POLE variant, deemed to be unrelated to cancer risk.      STRATA Somatic sequencing of bone metastasis 8/24/23: AR amplification, CDKN2B deep deletion, [id number] Fusion. Negative Genomic Findings included JIM, BRAF, BRCA1, BRCA2, BRIP1, CDK12, CHEK1, CHEK2, NTRK1, NTRK2, NTRK3, PALB2, RET.  Genomic Signatures Microsatellite Stable, TMB Low (1 muts/Mb) Immunotherapy Response Score IRS Ultra-Low.     TREATMENT HISTORY:   Lupron  Abiraterone/prednisone  Enzalutamide  Docetaxel-CRPC  Pluvicto 10/26/23-ongoing     GUIDELINES USED: NCCN    INTENT OF THERAPY: Palliative.  Discussed at 10/23/23 appt.      CLINICAL TRIALS:  RECOMBINETICS CD3-PSMA and  Biobank    ALLERGIES: amlodipine (edema), bee venom (high), penicillins (tolerated ceftriaxone 2020, ampicillin and Unasyn in March 2023 per [facility] pharmacy notes).      MEDS:  Amlodipine 5mg  Asa 81mg daily   Atorvastatin 80mg at bedtime  Calcium carbonate  Chlorthalidone  Losartan 100mg daily   Pluvicto     ROS-Remainder of 14 point ROS reviewed and negative except as in HPI.    PHYSICAL EXAM:  ECOG-PS=1    /70   Pulse 104   Temp 97.6  F (36.4  C) (Oral)   Resp 16   Wt 64.9 kg (143 lb)   SpO2 94%   BMI 22.13 kg/m    Exam:  Constitutional: alert and no distress, pale appearing. Sitting comfortably in a chair.   Head: Normocephalic. No masses, lesions,  tenderness or abnormalities  Neck: Neck supple. No adenopathy grossly, trachea midline.   ENT: ENT exam normal, MMM, no sores/ulcers visible.   Cardiovascular: No edema or JVD.  Respiratory: negative, normal WOB on RA, no wheezing or rhonchi audible.   Gastrointestinal: negative, non-distended.   : Deferred  Musculoskeletal: extremities normal- no gross deformities noted and normal muscle tone, walking with cane.   Skin: no suspicious lesions or rashes on exposed areas of skin   Neurologic: negative, CNII-XII grossly intact, normal speech.   Psychiatric: mentation appears normal and affect normal/bright    LABS AND IMAGES:    PSA trend, see oncology history.   Most Recent 3 CBC's:  Recent Labs   Lab Test 01/04/24  1222 11/20/23  0903 10/23/23  1015   WBC 10.4 9.0 13.3*   HGB 8.8* 9.6* 9.5*   MCV 91 94 95    398 301   ANEUTAUTO 8.5* 7.0 12.1*     Most Recent 3 BMP's:  Recent Labs   Lab Test 01/04/24  1222 11/20/23  0903 10/23/23  1015    141 139   POTASSIUM 4.0 4.0 4.7   CHLORIDE 104 105 104   CO2 22 25 24   BUN 38.2* 42.2* 37.1*   CR 1.27* 1.32* 1.21*   ANIONGAP 13 11 11   PAXTON 9.3 9.3 9.5   * 102* 101*   PROTTOTAL 7.1 6.7 6.3*   ALBUMIN 3.5 3.5 3.8    Most Recent 3 LFT's:  Recent Labs   Lab Test 01/04/24  1222 11/20/23  0903 10/23/23  1015   AST 95* 54* 61*   ALT 11 17 22   ALKPHOS 317* 224* 148*   BILITOTAL 0.4 0.2 0.4    Most Recent 2 TSH and T4:No lab results found.  I reviewed the above labs today.      IMPRESSION AND PLAN:    # metastatic, castration resistant prostate cancer.    - Domenico Garcia presented initially on 10/23/23 for second opinion and consideration of Pluvicto for metastatic, castration-resistant prostate cancer.  He was referred by his primary oncologist, Dr. Bry Ann from [facility].  He was initially diagnosed with Zay 4+4=8 prostate adenocarcinoma in August of 2021 with extensive adenopathy and a PSA of 649.  His PSA austin on therapy was 10.3.   He then underwent radiation therapy to the prostate and pelvis as part of the  trial, but received a reduced dose of 5580cGy due to urinary symptoms.  Due to PSA rise, he was switched to enzalutamide in 11/2022 and then docetaxel in March of 2023. He had a temporarily PSA response to docetaxel, but then PSA began to rise above his pre-treatment baseline.  He has undergone germline and somatic genomic testing and there were no targetable mutations noted.  He was referred for consideration of Pluvicto therapy for mCPRC.    - He began cycle 1 pluvicto which he tolerated adequately. However, since cycle 2 he has been having much more difficulty with fatigue, anorexia, weight loss, and new onset of nausea/constipation. He unfortunately sustained a fall requiring a 2 week admission to [facility] from 12/26/23-12/27/23. Upon lab review, his hemoglobin dropped into the 7's during his admission. His hemoglobin is up trending to 8.8 but is below his pre-treatment hemoglobin around 9.6. He has mild neutropenia today at 8.5- afebrile in clinic.  His creatinine appears stable today. He has rising alk phos and AST (grade 1) elevation. ALT is completely normal.   - Please see Dr. Lindsay's note regarding clinical trial consideration as next steps with KHARI-280 and [id number].  - Given recent hospitalization and labs, recommend that we delay his cycle 2 Anitra-177 and have him return in 2 weeks for further consideration. His PSA has returned today and has increased to 477.70 from 251.80 prior to cycle 2. It can take 2-3 cycles to see a PSA response.     #Nausea  #Constipation  #Anorexia  - delayed onset nausea 4 weeks after his last dose. This is atypical timing. Considering a slightly elevated ANC will work this up further with an XR of the abdomen. Of note, he did have a mild elevated neutropenia two months ago that normalized one month ago.   - Start miralax 1/2-1 capful daily for constipation.  - He has loss ~10 pounds in 6  weeks. We discussed increasing dietary protein and continuing protein shakes. Discussed reframing food as medicine and focusing on improving nutrition over the next couple of weeks.   - declines nutrition referral.     #Syncope   - per patient appears to be related to poor PO intake and hypoglycemia. Per lab review, he did have a measurable episode of hypoglycemia of 34 while in the hospital.      # possible iron deficiency anemia vs anemia of chronic disease.    Hgb is low and iron saturation is mildly low.  Ferritin is slightly elevated. This is bordeline HILARY vs anemia of chronic disease, but I think it is worth a short course of iron supplementation to see if there is improvement.  If no improvement after 2-3 months, then reasonable to stop to prevent iron overload. He confirms he started a PO iron supplement. This in part may be contributing to GI upset and constipation.     # falls.  Two prior to 11/20/23 visit. Recurrent fall 12/26/23 with LOC and head trauma requiring admission to [facility] from 12/26-12/28.  No LOC no head strike.  Consider PT referral for strength and balance.      #Follow up   - XR today   - Hold 1/19/24 cycle 3 pluvicto.   - Add a visit with Dr. Lindsay on 1/22/24 at 9:30 am to consider further pluvicto at that time with labs prior.     Reviewed plan with Dr. Lindsay who is in agreement.     69 minutes spent on the date of the encounter doing chart review, review of outside records, review of test results, interpretation of tests, patient visit, documentation, and discussion with family     Karen Cooper PA-C

## 2024-01-22 NOTE — NURSING NOTE
"Oncology Rooming Note    January 22, 2024 9:40 AM   Domenico Garcia is a 78 year old male who presents for:    Chief Complaint   Patient presents with    Oncology Clinic Visit     P RETURN - PROSTATE CANCER     Initial Vitals: /61 (BP Location: Right arm, Patient Position: Chair, Cuff Size: Adult Large)   Pulse 78   Temp 98.6  F (37  C)   Resp 16   Ht 1.712 m (5' 7.4\")   Wt 65.6 kg (144 lb 9.6 oz)   SpO2 99%   BMI 22.38 kg/m   Estimated body mass index is 22.38 kg/m  as calculated from the following:    Height as of this encounter: 1.712 m (5' 7.4\").    Weight as of this encounter: 65.6 kg (144 lb 9.6 oz). Body surface area is 1.77 meters squared.  No Pain (0) Comment: Data Unavailable   No LMP for male patient.  Allergies reviewed: Yes  Medications reviewed: Yes    Medications: Medication refills not needed today.  Pharmacy [facility] into [facility] [id number] [place] [id number]    Frailty Screening:   Is the patient here for a new oncology consult visit in cancer care? 2. No    Armin Rivas LPN              "

## 2024-01-22 NOTE — LETTER
1/22/2024         RE: Domenico Garcia  [address] [facility] [address]        Dear Colleague,    Thank you for referring your patient, Domenico Garcia, to the [facility]. Please see a copy of my visit note below.      [facility] Medical Oncology Return Visit Note       Date of visit: Jan 22, 2024    CC: Return visit prior to C3 Pluvicto.     INTERVAL HISTORY: Unfortunately, Domenico had a fall right after Shy requiring hospitalization.  BG was 30 when EMS arrived and was admitted to [facility].  Hgb has dropped during this interval and PSA has been rising.  More tired.  Takes a little longer to recover after each successive Pluvicto dose.  Appetite is OK.  No pain at the moment.  Short of breath with minimal effort, which has been worsening over the past few weeks.  Eating smaller meals throughout the day to help with hypoglycemia.        ONCOLOGY HISTORY:    #Prostate cancer.   July/August 2021-Hematuria noted.  Imaging workup notable for bilateral hydronephrosis and abdominal adenopathy up to 4.1cm in largest diameter.  .    8/16/21-Perineural invasion is present.  Zay 4+4=8 in 2 cores.  9/9 cores positive for prostate adenocarcinoma.  Perineural invasion was present.  Start bicalutamide. Bilateral PNT placement.   9/15/21-Lupron dose #1.   12/8/21-Start abiraterone/prednisone.   3/7/22-PSA austin to 10.3.    4/2022-Start radiation on clinical trial .  5580 cGy in 36 fractions delivered.  Completed 6/6/22.  Reduced from planned 7920cGy due to symptoms.    8/1/22-PSA 7.4.   10/2022-PSA rising to 26.  Noted to be CRPC.  Progressive bony disease noted on restaging studies.   11/30/22-Start enzalutamide.    2/27/23-PSA continued to rise to 107 on enzalutamide.  Start docetaxel in CRPC setting.    6/15/23-PSA austin on docetaxel of 115.7.    7/6/23-.6  8/17/23-  9/7/23-.9  9/29/23-.5.  Referral to [facility] for consideration of  "Pluvicto.   10/23/23-Initial consultation for Pluvicto.   Labs adequate for Dose #1 with Hgb of 9.5.  No transfusion needed.    10/26/23-Pluvicto Dose #1.   11/20/23-Pre-visit for Dose #2.  Hgb 9.6.  .80.  01/04/24-Pre-visit for Dose #3. Patient had a significant fall requiring hospitalization 12/26.  Hypoglycemic to 30 on EMS arrival.    1/15/24-.    1/22/24-.  Hgb 7.3.  Cancel additional Pluvicto doses at the moment.  No targetable mutations noted on most recent Strata testing from 8/2023.      GENOMIC STUDIES:     Germline genomic testing: Pathogenic POLE variant, deemed to be unrelated to cancer risk.      STRATA Somatic sequencing of bone metastasis 8/24/23: AR amplification, CDKN2B deep deletion, [id number] Fusion. Negative Genomic Findings included JIM, BRAF, BRCA1, BRCA2, BRIP1, CDK12, CHEK1, CHEK2, NTRK1, NTRK2, NTRK3, PALB2, RET.  Genomic Signatures Microsatellite Stable, TMB Low (1 muts/Mb) Immunotherapy Response Score IRS Ultra-Low.     TREATMENT HISTORY:   Lupron  Abiraterone/prednisone  Enzalutamide  Docetaxel-CRPC  Pluvicto 10/26/23-11/20/23-2 doses total given.  Dose 3 delayed initially due to fall and anemia.      GUIDELINES USED: NCCN    INTENT OF THERAPY: Palliative.  Discussed at 10/23/23 appt.      CLINICAL TRIALS:  None  Alexsandra and PDT055 both below Hgb threshold.      ALLERGIES: amlodipine (edema), bee venom (high), penicillins (tolerated ceftriaxone 2020, ampicillin and Unasyn in March 2023 per [facility] pharmacy notes).      MEDS:  Amlodipine 5mg  Asa 81mg daily   Atorvastatin 80mg at bedtime  Calcium carbonate  Chlorthalidone  Losartan 100mg daily     ROS-Remainder of 14 point ROS reviewed and negative except as in HPI.    PHYSICAL EXAM:  ECOG-PS=1    /61 (BP Location: Right arm, Patient Position: Chair, Cuff Size: Adult Large)   Pulse 78   Temp 98.6  F (37  C)   Resp 16   Ht 1.712 m (5' 7.4\")   Wt 65.6 kg (144 lb 9.6 oz)   SpO2 99%   BMI 22.38 kg/m  "   Exam:  Constitutional: alert and no distress, pale appearing. Sitting comfortably in a chair. Fatigued appearing.   Head: Normocephalic. No masses, lesions, tenderness or abnormalities  Neck: Neck supple. No adenopathy grossly, trachea midline.   ENT: ENT exam normal, MM  Cardiovascular: No edema or JVD.  Respiratory: negative, normal WOB on RA, no wheezing or rhonchi audible.   Gastrointestinal: negative, non-distended.   : Deferred  Musculoskeletal: extremities normal- no gross deformities noted and normal muscle tone, walking with cane.   Skin: no suspicious lesions or rashes on exposed areas of skin   Neurologic: negative, CNII-XII grossly intact, normal speech.   Psychiatric: mentation appears normal and affect normal/bright    LABS AND IMAGES:    PSA trend, see oncology history.   Most Recent 3 CBC's:  Recent Labs   Lab Test 01/22/24  0852 01/04/24  1222 11/20/23  0903   WBC 5.7 10.4 9.0   HGB 7.3* 8.8* 9.6*   MCV 93 91 94    384 398   ANEUTAUTO 4.0 8.5* 7.0     Most Recent 3 BMP's:  Recent Labs   Lab Test 01/22/24  0852 01/04/24  1222 11/20/23  0903    139 141   POTASSIUM 3.9 4.0 4.0   CHLORIDE 109* 104 105   CO2 22 22 25   BUN 36.5* 38.2* 42.2*   CR 1.12 1.27* 1.32*   ANIONGAP 12 13 11   PAXTON 9.1 9.3 9.3   * 103* 102*   PROTTOTAL 6.2* 7.1 6.7   ALBUMIN 3.3* 3.5 3.5    Most Recent 3 LFT's:  Recent Labs   Lab Test 01/22/24  0852 01/04/24  1222 11/20/23  0903   AST 89* 95* 54*   ALT 14 11 17   ALKPHOS 294* 317* 224*   BILITOTAL 0.3 0.4 0.2    Most Recent 2 TSH and T4:No lab results found.  I reviewed the above labs today.    Iron panel with worsening anemia of chronic disease, with rising ferritin.  B12 WNL.  Retic inappropriately normal.      IMPRESSION AND PLAN:    # metastatic, castration resistant prostate cancer.    - Domenico Garcia presented initially on 10/23/23 for second opinion and consideration of Pluvicto for metastatic, castration-resistant prostate cancer.  He was referred  by his primary oncologist, Dr. Bry Ann from [facility].  He was initially diagnosed with [place] 4+4=8 prostate adenocarcinoma in August of 2021 with extensive adenopathy and a PSA of 649.  His PSA austin on therapy was 10.3.  He then underwent radiation therapy to the prostate and pelvis as part of the  trial, but received a reduced dose of 5580cGy due to urinary symptoms.  Due to PSA rise, he was switched to enzalutamide in 11/2022 and then docetaxel in March of 2023. He had a temporarily PSA response to docetaxel, but then PSA began to rise above his pre-treatment baseline.  He has undergone germline and somatic genomic testing and there were no targetable mutations noted.  He was referred for consideration of Pluvicto therapy for mCPRC.  He has subsequently received 2 doses of Pluvicto prior to falling around [place] requiring hospital admission.  Hgb declining into 7s also and PSA is rising (nearly double) after dose #2. At our 1/22/24 visit, we discussed his progressive PSA and worsening Hgb, which is likely a combination of anemia of chronic disease, prostate cancer progression, and possibly an element of Pluvicto side effects.  With a Hgb of 7.3, we are below the typical parameters to treat and with the rising PSA, he is not gaining any benefit from the side effects of therapy.  At this point, my recommendation is to hold Pluvicto and return to Dr. Ann for consideration of other standard treatments.  He has no targetable mutations unfortunately and I discussed that his Hgb would likely preclude cabazitaxel or other conventional chemotherapy.  Based on his Hgb trend, he will likely need to be transfused next wee,.  He does prefer cares at [facility] with it being closer to home, which I am supportive of.  I have sent Dr. Ann an inbasket message and emailed him/his RNCC as well.    -Continue Lupron.   -HOLD pluvicto.    -Consider cabazitaxel if Hgb improving.    -Unfortunately  ineligible for trials with Hgb <9.0 unless this rises and remains more stable.      #anemia in neoplastic disease.   MIldly iron deficient in the context of rising ferritin consistent with anemia of chronic disease.  He is taking oral iron three times per week, which I don't think is hurting, but there is no benefit to escalating iron supplementation.  Retic is inappropriately normal for his level of Hgb.      #Nausea  #Constipation  #Anorexia  - delayed onset nausea 4 weeks after his last dose. This is atypical timing. Considering a slightly elevated ANC will work this up further with an XR of the abdomen. Of note, he did have a mild elevated neutropenia two months ago that normalized one month ago.   - Start miralax 1/2-1 capful daily for constipation.  - He has loss ~10 pounds in 6 weeks. We discussed increasing dietary protein and continuing protein shakes. Discussed reframing food as medicine and focusing on improving nutrition over the next couple of weeks.   - declines nutrition referral.     #Syncope   - per patient appears to be related to poor PO intake and hypoglycemia. Per lab review, he did have a measurable episode of hypoglycemia of 34 while in the hospital.      # possible iron deficiency anemia vs anemia of chronic disease.    Hgb is low and iron saturation is mildly low.  Ferritin is slightly elevated. This is bordeline HILARY vs anemia of chronic disease, but I think it is worth a short course of iron supplementation to see if there is improvement.  If no improvement after 2-3 months, then reasonable to stop to prevent iron overload. He confirms he started a PO iron supplement. This in part may be contributing to GI upset and constipation.     # falls.  Two prior to 11/20/23 visit. Recurrent fall 12/26/23 with LOC and head trauma requiring admission to [facility] from 12/26-12/28.  No LOC no head strike.  Consider PT referral for strength and balance.      #Follow up   -WIth me PRN.   -Soon  with Dr. Ann and his team at [facility] for transfusion/Hgb check.    -Continue Lupron with Dr. Ann.      A total of 60 minutes were spent on this patient on the day of the encounter, of which more than 50% of this time was used for counseling and coordination of care.  The patient was given the opportunity to ask multiple questions today, all of which were answered to their satisfaction.     Gee Lindsay MD, PhD   of Medicine   Oncology/BMT/Cellular Therapies

## 2024-01-22 NOTE — NURSING NOTE
"Chief Complaint   Patient presents with    Port Draw     Labs drawn via port by RN. Port accessed with 20g 3/4\" power needle. Vitals taken. Flushed with saline and heparin. Pt tolerated well. Patient checked into next appointment.       Jayne Moore RN    " DARIELA

## 2024-01-22 NOTE — PROGRESS NOTES
[facility] Medical Oncology Return Visit Note       Date of visit: Jan 22, 2024    CC: Return visit prior to C3 Pluvicto.     INTERVAL HISTORY: Unfortunately, Domenico had a fall right after Shy requiring hospitalization.  BG was 30 when EMS arrived and was admitted to [facility].  Hgb has dropped during this interval and PSA has been rising.  More tired.  Takes a little longer to recover after each successive Pluvicto dose.  Appetite is OK.  No pain at the moment.  Short of breath with minimal effort, which has been worsening over the past few weeks.  Eating smaller meals throughout the day to help with hypoglycemia.        ONCOLOGY HISTORY:    #Prostate cancer.   July/August 2021-Hematuria noted.  Imaging workup notable for bilateral hydronephrosis and abdominal adenopathy up to 4.1cm in largest diameter.  .    8/16/21-Perineural invasion is present.  [place] 4+4=8 in 2 cores.  9/9 cores positive for prostate adenocarcinoma.  Perineural invasion was present.  Start bicalutamide. Bilateral PNT placement.   9/15/21-Lupron dose #1.   12/8/21-Start abiraterone/prednisone.   3/7/22-PSA austin to 10.3.    4/2022-Start radiation on clinical trial .  5580 cGy in 36 fractions delivered.  Completed 6/6/22.  Reduced from planned 7920cGy due to symptoms.    8/1/22-PSA 7.4.   10/2022-PSA rising to 26.  Noted to be CRPC.  Progressive bony disease noted on restaging studies.   11/30/22-Start enzalutamide.    2/27/23-PSA continued to rise to 107 on enzalutamide.  Start docetaxel in CRPC setting.    6/15/23-PSA austin on docetaxel of 115.7.    7/6/23-.6  8/17/23-  9/7/23-.9  9/29/23-.5.  Referral to [facility] for consideration of Pluvicto.   10/23/23-Initial consultation for Pluvicto.   Labs adequate for Dose #1 with Hgb of 9.5.  No transfusion needed.    10/26/23-Pluvicto Dose #1.   11/20/23-Pre-visit for Dose #2.  Hgb 9.6.  .80.  01/04/24-Pre-visit for Dose #3. Patient had a  "significant fall requiring hospitalization 12/26.  Hypoglycemic to 30 on EMS arrival.    1/15/24-.    1/22/24-.  Hgb 7.3.  Cancel additional Pluvicto doses at the moment.  No targetable mutations noted on most recent Strata testing from 8/2023.      GENOMIC STUDIES:     Germline genomic testing: Pathogenic POLE variant, deemed to be unrelated to cancer risk.      STRATA Somatic sequencing of bone metastasis 8/24/23: AR amplification, CDKN2B deep deletion, [id number] Fusion. Negative Genomic Findings included JIM, BRAF, BRCA1, BRCA2, BRIP1, CDK12, CHEK1, CHEK2, NTRK1, NTRK2, NTRK3, PALB2, RET.  Genomic Signatures Microsatellite Stable, TMB Low (1 muts/Mb) Immunotherapy Response Score IRS Ultra-Low.     TREATMENT HISTORY:   Lupron  Abiraterone/prednisone  Enzalutamide  Docetaxel-CRPC  Pluvicto 10/26/23-11/20/23-2 doses total given.  Dose 3 delayed initially due to fall and anemia.      GUIDELINES USED: NCCN    INTENT OF THERAPY: Palliative.  Discussed at 10/23/23 appt.      CLINICAL TRIALS:  None  Alexsandra and NER376 both below Hgb threshold.      ALLERGIES: amlodipine (edema), bee venom (high), penicillins (tolerated ceftriaxone 2020, ampicillin and Unasyn in March 2023 per [facility] pharmacy notes).      MEDS:  Amlodipine 5mg  Asa 81mg daily   Atorvastatin 80mg at bedtime  Calcium carbonate  Chlorthalidone  Losartan 100mg daily     ROS-Remainder of 14 point ROS reviewed and negative except as in HPI.    PHYSICAL EXAM:  ECOG-PS=1    /61 (BP Location: Right arm, Patient Position: Chair, Cuff Size: Adult Large)   Pulse 78   Temp 98.6  F (37  C)   Resp 16   Ht 1.712 m (5' 7.4\")   Wt 65.6 kg (144 lb 9.6 oz)   SpO2 99%   BMI 22.38 kg/m    Exam:  Constitutional: alert and no distress, pale appearing. Sitting comfortably in a chair. Fatigued appearing.   Head: Normocephalic. No masses, lesions, tenderness or abnormalities  Neck: Neck supple. No adenopathy grossly, trachea midline.   ENT: ENT exam " normal, MM  Cardiovascular: No edema or JVD.  Respiratory: negative, normal WOB on RA, no wheezing or rhonchi audible.   Gastrointestinal: negative, non-distended.   : Deferred  Musculoskeletal: extremities normal- no gross deformities noted and normal muscle tone, walking with cane.   Skin: no suspicious lesions or rashes on exposed areas of skin   Neurologic: negative, CNII-XII grossly intact, normal speech.   Psychiatric: mentation appears normal and affect normal/bright    LABS AND IMAGES:    PSA trend, see oncology history.   Most Recent 3 CBC's:  Recent Labs   Lab Test 01/22/24  0852 01/04/24  1222 11/20/23  0903   WBC 5.7 10.4 9.0   HGB 7.3* 8.8* 9.6*   MCV 93 91 94    384 398   ANEUTAUTO 4.0 8.5* 7.0     Most Recent 3 BMP's:  Recent Labs   Lab Test 01/22/24  0852 01/04/24  1222 11/20/23  0903    139 141   POTASSIUM 3.9 4.0 4.0   CHLORIDE 109* 104 105   CO2 22 22 25   BUN 36.5* 38.2* 42.2*   CR 1.12 1.27* 1.32*   ANIONGAP 12 13 11   PAXTON 9.1 9.3 9.3   * 103* 102*   PROTTOTAL 6.2* 7.1 6.7   ALBUMIN 3.3* 3.5 3.5    Most Recent 3 LFT's:  Recent Labs   Lab Test 01/22/24  0852 01/04/24  1222 11/20/23  0903   AST 89* 95* 54*   ALT 14 11 17   ALKPHOS 294* 317* 224*   BILITOTAL 0.3 0.4 0.2    Most Recent 2 TSH and T4:No lab results found.  I reviewed the above labs today.    Iron panel with worsening anemia of chronic disease, with rising ferritin.  B12 WNL.  Retic inappropriately normal.      IMPRESSION AND PLAN:    # metastatic, castration resistant prostate cancer.    - Domenico Garcia presented initially on 10/23/23 for second opinion and consideration of Pluvicto for metastatic, castration-resistant prostate cancer.  He was referred by his primary oncologist, Dr. Bry Ann from [facility].  He was initially diagnosed with Zay 4+4=8 prostate adenocarcinoma in August of 2021 with extensive adenopathy and a PSA of 649.  His PSA austin on therapy was 10.3.  He then  underwent radiation therapy to the prostate and pelvis as part of the  trial, but received a reduced dose of 5580cGy due to urinary symptoms.  Due to PSA rise, he was switched to enzalutamide in 11/2022 and then docetaxel in March of 2023. He had a temporarily PSA response to docetaxel, but then PSA began to rise above his pre-treatment baseline.  He has undergone germline and somatic genomic testing and there were no targetable mutations noted.  He was referred for consideration of Pluvicto therapy for mCPRC.  He has subsequently received 2 doses of Pluvicto prior to falling around [place] requiring hospital admission.  Hgb declining into 7s also and PSA is rising (nearly double) after dose #2. At our 1/22/24 visit, we discussed his progressive PSA and worsening Hgb, which is likely a combination of anemia of chronic disease, prostate cancer progression, and possibly an element of Pluvicto side effects.  With a Hgb of 7.3, we are below the typical parameters to treat and with the rising PSA, he is not gaining any benefit from the side effects of therapy.  At this point, my recommendation is to hold Pluvicto and return to Dr. Ann for consideration of other standard treatments.  He has no targetable mutations unfortunately and I discussed that his Hgb would likely preclude cabazitaxel or other conventional chemotherapy.  Based on his Hgb trend, he will likely need to be transfused next wee,.  He does prefer cares at [facility] with it being closer to home, which I am supportive of.  I have sent Dr. Ann an inbasket message and emailed him/his RNCC as well.    -Continue Lupron.   -HOLD pluvicto.    -Consider cabazitaxel if Hgb improving.    -Unfortunately ineligible for trials with Hgb <9.0 unless this rises and remains more stable.      #anemia in neoplastic disease.   MIldly iron deficient in the context of rising ferritin consistent with anemia of chronic disease.  He is taking oral iron three times per  week, which I don't think is hurting, but there is no benefit to escalating iron supplementation.  Retic is inappropriately normal for his level of Hgb.      #Nausea  #Constipation  #Anorexia  - delayed onset nausea 4 weeks after his last dose. This is atypical timing. Considering a slightly elevated ANC will work this up further with an XR of the abdomen. Of note, he did have a mild elevated neutropenia two months ago that normalized one month ago.   - Start miralax 1/2-1 capful daily for constipation.  - He has loss ~10 pounds in 6 weeks. We discussed increasing dietary protein and continuing protein shakes. Discussed reframing food as medicine and focusing on improving nutrition over the next couple of weeks.   - declines nutrition referral.     #Syncope   - per patient appears to be related to poor PO intake and hypoglycemia. Per lab review, he did have a measurable episode of hypoglycemia of 34 while in the hospital.      # possible iron deficiency anemia vs anemia of chronic disease.    Hgb is low and iron saturation is mildly low.  Ferritin is slightly elevated. This is bordeline HILARY vs anemia of chronic disease, but I think it is worth a short course of iron supplementation to see if there is improvement.  If no improvement after 2-3 months, then reasonable to stop to prevent iron overload. He confirms he started a PO iron supplement. This in part may be contributing to GI upset and constipation.     # falls.  Two prior to 11/20/23 visit. Recurrent fall 12/26/23 with LOC and head trauma requiring admission to [facility] from 12/26-12/28.  No LOC no head strike.  Consider PT referral for strength and balance.      #Follow up   -WIth me PRN.   -Soon with Dr. Ann and his team at [facility] for transfusion/Hgb check.    -Continue Lupron with Dr. Ann.      A total of 60 minutes were spent on this patient on the day of the encounter, of which more than 50% of this time was used for counseling and  coordination of care.  The patient was given the opportunity to ask multiple questions today, all of which were answered to their satisfaction.     Gee Lindsay MD, PhD   of Medicine   Oncology/BMT/Cellular Therapies